# Patient Record
Sex: FEMALE | Race: WHITE | Employment: UNEMPLOYED | URBAN - NONMETROPOLITAN AREA
[De-identification: names, ages, dates, MRNs, and addresses within clinical notes are randomized per-mention and may not be internally consistent; named-entity substitution may affect disease eponyms.]

---

## 2017-08-28 ENCOUNTER — HOSPITAL ENCOUNTER (OUTPATIENT)
Dept: WOMENS IMAGING | Age: 47
Discharge: HOME | End: 2017-08-28
Payer: COMMERCIAL

## 2017-08-28 DIAGNOSIS — Z12.31 VISIT FOR SCREENING MAMMOGRAM: ICD-10-CM

## 2017-08-28 PROCEDURE — 77063 BREAST TOMOSYNTHESIS BI: CPT

## 2017-11-14 RX ORDER — PANTOPRAZOLE SODIUM 40 MG/1
TABLET, DELAYED RELEASE ORAL
Qty: 30 TABLET | Refills: 5 | OUTPATIENT
Start: 2017-11-14

## 2018-01-17 RX ORDER — PANTOPRAZOLE SODIUM 40 MG/1
TABLET, DELAYED RELEASE ORAL
Qty: 30 TABLET | Refills: 5 | OUTPATIENT
Start: 2018-01-17

## 2018-08-31 ENCOUNTER — HOSPITAL ENCOUNTER (OUTPATIENT)
Dept: WOMENS IMAGING | Age: 48
Discharge: HOME | End: 2018-08-31
Payer: COMMERCIAL

## 2018-08-31 DIAGNOSIS — Z12.39 BREAST CANCER SCREENING: ICD-10-CM

## 2018-08-31 PROCEDURE — 77063 BREAST TOMOSYNTHESIS BI: CPT

## 2018-09-04 ENCOUNTER — TELEPHONE (OUTPATIENT)
Dept: WOMENS IMAGING | Age: 48
End: 2018-09-04

## 2018-09-05 ENCOUNTER — HOSPITAL ENCOUNTER (OUTPATIENT)
Dept: ULTRASOUND IMAGING | Age: 48
End: 2018-09-05

## 2018-09-05 ENCOUNTER — HOSPITAL ENCOUNTER (OUTPATIENT)
Dept: WOMENS IMAGING | Age: 48
Discharge: HOME | End: 2018-09-05

## 2018-09-05 DIAGNOSIS — N63.0 BREAST NODULE: ICD-10-CM

## 2018-09-05 PROCEDURE — G0279 TOMOSYNTHESIS, MAMMO: HCPCS

## 2018-11-07 ENCOUNTER — TRANSCRIBE ORDERS (OUTPATIENT)
Dept: ADMINISTRATIVE | Facility: HOSPITAL | Age: 48
End: 2018-11-07

## 2018-11-07 DIAGNOSIS — M54.5 ACUTE LOW BACK PAIN, UNSPECIFIED BACK PAIN LATERALITY, WITH SCIATICA PRESENCE UNSPECIFIED: Primary | ICD-10-CM

## 2018-11-08 ENCOUNTER — HOSPITAL ENCOUNTER (OUTPATIENT)
Dept: MRI IMAGING | Facility: HOSPITAL | Age: 48
Discharge: HOME | End: 2018-11-08
Admitting: NURSE PRACTITIONER

## 2018-11-08 DIAGNOSIS — M54.5 ACUTE LOW BACK PAIN, UNSPECIFIED BACK PAIN LATERALITY, WITH SCIATICA PRESENCE UNSPECIFIED: ICD-10-CM

## 2018-11-08 LAB — CREAT BLDA-MCNC: 0.8 MG/DL (ref 0.6–1.3)

## 2018-11-08 PROCEDURE — 72158 MRI LUMBAR SPINE W/O & W/DYE: CPT

## 2018-11-08 PROCEDURE — 82565 ASSAY OF CREATININE: CPT

## 2018-11-08 PROCEDURE — A9577 INJ MULTIHANCE: HCPCS | Performed by: NURSE PRACTITIONER

## 2018-11-08 PROCEDURE — 0 GADOBENATE DIMEGLUMINE 529 MG/ML SOLUTION: Performed by: NURSE PRACTITIONER

## 2018-11-08 RX ADMIN — GADOBENATE DIMEGLUMINE 16 ML: 529 INJECTION, SOLUTION INTRAVENOUS at 07:53

## 2018-12-06 ENCOUNTER — OFFICE VISIT (OUTPATIENT)
Dept: NEUROSURGERY | Facility: CLINIC | Age: 48
End: 2018-12-06

## 2018-12-06 VITALS
BODY MASS INDEX: 29.8 KG/M2 | HEIGHT: 66 IN | DIASTOLIC BLOOD PRESSURE: 85 MMHG | WEIGHT: 185.4 LBS | SYSTOLIC BLOOD PRESSURE: 124 MMHG

## 2018-12-06 DIAGNOSIS — Z78.9 NON-SMOKER: ICD-10-CM

## 2018-12-06 DIAGNOSIS — M51.26 DISPLACEMENT OF LUMBAR INTERVERTEBRAL DISC WITHOUT MYELOPATHY: Primary | ICD-10-CM

## 2018-12-06 PROCEDURE — 99204 OFFICE O/P NEW MOD 45 MIN: CPT | Performed by: NURSE PRACTITIONER

## 2018-12-06 RX ORDER — DICLOFENAC SODIUM 75 MG/1
75 TABLET, DELAYED RELEASE ORAL 2 TIMES DAILY
Qty: 60 TABLET | Refills: 2 | Status: SHIPPED | OUTPATIENT
Start: 2018-12-06

## 2018-12-06 RX ORDER — LEVOTHYROXINE SODIUM 0.1 MG/1
100 TABLET ORAL DAILY
COMMUNITY

## 2018-12-06 RX ORDER — NAPROXEN 500 MG/1
TABLET ORAL
COMMUNITY

## 2018-12-06 RX ORDER — METAXALONE 800 MG/1
TABLET ORAL
COMMUNITY
Start: 2015-08-17

## 2018-12-06 RX ORDER — ATORVASTATIN CALCIUM 10 MG/1
10 TABLET, FILM COATED ORAL DAILY
COMMUNITY

## 2018-12-06 RX ORDER — FERROUS SULFATE 325(65) MG
TABLET ORAL
COMMUNITY
Start: 2015-08-17

## 2018-12-06 RX ORDER — LIOTHYRONINE SODIUM 5 UG/1
5 TABLET ORAL DAILY
COMMUNITY

## 2018-12-06 RX ORDER — CYCLOBENZAPRINE HCL 10 MG
TABLET ORAL
COMMUNITY
Start: 2015-08-17

## 2018-12-06 NOTE — PROGRESS NOTES
Chief complaint:   Chief Complaint   Patient presents with   • Back Pain     Cindy is here today with complaint of low back pain, she does have some left leg pain with numbness of the thigh.  She has already had an MRI of her lumbar, but she has not had any physical therapy.        Subjective     HPI: This is a 48-year-old feel patient who is referred to us by ROBERT Love for back pain.  She is here to be evaluated today.  She states that the pain in her back began on 10/17/2018.  There is no accident or injury associated with this.  She states that she just got out of bed his certain having pain in her back.  The pain is constant.  Its worse when she sitting for an extended period time and better when she is standing.  She does not have any lower extremity pain but does have numbness and tingling in her left thigh and the lateral aspect to her knee.  This is intermittent.  Nothing really makes this worse or better.  Denies any bowel or bladder incontinence.  She has not done any recent physical therapy.  She did try chiropractic care but did not have any meaningful relief.  She not had any pain management injections.  She rates the pain on scale 0-10 at its most intense at an 8.  The pain does wax and wane.  She is left-hand dominant.  She is self-employed.  She is .  Denies any tobacco, alcohol, or illicit drug use.  Past medical history is significant for a complete thyroidectomy as well as hypercholesterolemia and acid reflux.    Review of Systems   Musculoskeletal: Positive for back pain, neck pain and neck stiffness.   Neurological: Positive for numbness and headaches.   All other systems reviewed and are negative.       Past Medical History:   Diagnosis Date   • No known health problems      Past Surgical History:   Procedure Laterality Date   • THYROIDECTOMY     • TONSILLECTOMY       Family History   Problem Relation Age of Onset   • Heart disease Father      Social History     Tobacco Use  "  • Smoking status: Never Smoker   • Smokeless tobacco: Never Used   Substance Use Topics   • Alcohol use: No     Frequency: Never   • Drug use: No       (Not in a hospital admission)  Allergies:  Patient has no known allergies.    Objective      Vital Signs  /85 (BP Location: Right arm, Patient Position: Sitting)   Ht 167.6 cm (66\")   Wt 84.1 kg (185 lb 6.4 oz)   BMI 29.92 kg/m²     Physical Exam   Constitutional: She is oriented to person, place, and time. She appears well-developed and well-nourished.   HENT:   Head: Normocephalic.   Eyes: Conjunctivae, EOM and lids are normal. Pupils are equal, round, and reactive to light.   Neck: Normal range of motion.   Cardiovascular: Normal rate, regular rhythm and normal heart sounds.   Pulmonary/Chest: Effort normal and breath sounds normal.   Abdominal: Normal appearance.   Musculoskeletal: Normal range of motion.        Cervical back: She exhibits pain.        Lumbar back: She exhibits pain.   Neurological: She is alert and oriented to person, place, and time. She has normal strength and normal reflexes. She displays normal reflexes. No cranial nerve deficit or sensory deficit. GCS eye subscore is 4. GCS verbal subscore is 5. GCS motor subscore is 6.   Skin: Skin is warm.   Psychiatric: She has a normal mood and affect. Her speech is normal and behavior is normal. Thought content normal. Cognition and memory are normal.       Results Review: MRI of the lumbar spine that was done here at [facility] shows the patient does have a disc bulging present at L4-5 that is putting some mild pressure on the thecal sac and causing bilateral neuroforaminal narrowing to a mild degree.  No malalignment or fracture visualized.  No cord signal change.          Assessment/Plan: At this point I am going to start the patient into a dedicated course of physical therapy consisting of 2-3 times a week for 4-6 weeks.  I am also to start her on diclofenac to see if this will help better " control her pain.  We will follow-up again with her in 8-10 weeks which time she will see Dr. Gloria.  Should she not have any improvement from the therapy she may be a good candidate for possible injections in her back.  BMI shows that she is overweight.  BMI chart was given the patient.  She is a nonsmoker.      Cindy was seen today for back pain.    Diagnoses and all orders for this visit:    Displacement of lumbar intervertebral disc without myelopathy  -     Ambulatory Referral to Physical Therapy Evaluate and treat (2-3 days a week for 4-6 weeks )    BMI 29.0-29.9,adult    Non-smoker    Other orders  -     diclofenac (VOLTAREN) 75 MG EC tablet; Take 1 tablet by mouth 2 (Two) Times a Day.          I discussed the patients findings and my recommendations with patient    Leonardo Messer, ROBERT  12/06/18  1:27 PM

## 2018-12-06 NOTE — PATIENT INSTRUCTIONS

## 2018-12-13 ENCOUNTER — APPOINTMENT (OUTPATIENT)
Dept: PHYSICAL THERAPY | Facility: HOSPITAL | Age: 48
End: 2018-12-13

## 2018-12-17 ENCOUNTER — HOSPITAL ENCOUNTER (OUTPATIENT)
Dept: PHYSICAL THERAPY | Facility: HOSPITAL | Age: 48
Setting detail: THERAPIES SERIES
Discharge: HOME | End: 2018-12-17

## 2018-12-17 DIAGNOSIS — M51.26 DISPLACEMENT OF LUMBAR INTERVERTEBRAL DISC WITHOUT MYELOPATHY: Primary | ICD-10-CM

## 2018-12-17 PROCEDURE — 97161 PT EVAL LOW COMPLEX 20 MIN: CPT | Performed by: PHYSICAL THERAPIST

## 2018-12-18 NOTE — THERAPY EVALUATION
Outpatient Physical Therapy Ortho Initial Evaluation  [facility]     Patient Name: Cindy Robert  : 1970  MRN: [id number]  Today's Date: 2018      Visit Date: 2018     Subjective Improvement:   N/A    Attendance:   Approved:   Requires insurance authorization        MD follow up:  3/14/19          date:   19        Patient Active Problem List   Diagnosis   • Displacement of lumbar intervertebral disc without myelopathy   • BMI 29.0-29.9,adult   • Non-smoker        Past Medical History:   Diagnosis Date   • No known health problems         Past Surgical History:   Procedure Laterality Date   • THYROIDECTOMY     • TONSILLECTOMY       No Known Allergies      Current Outpatient Medications:   •  atorvastatin (LIPITOR) 10 MG tablet, Take 10 mg by mouth Daily., Disp: , Rfl:   •  cyclobenzaprine (FLEXERIL) 10 MG tablet, 10 mg one tab by mouth as needed, Disp: , Rfl:   •  diclofenac (VOLTAREN) 75 MG EC tablet, Take 1 tablet by mouth 2 (Two) Times a Day., Disp: 60 tablet, Rfl: 2  •  ferrous sulfate 325 (65 FE) MG tablet, 325 mg one tab by mouth daily, Disp: , Rfl:   •  levothyroxine (SYNTHROID, LEVOTHROID) 100 MCG tablet, Take 100 mcg by mouth Daily., Disp: , Rfl:   •  liothyronine (CYTOMEL) 5 MCG tablet, Take 5 mcg by mouth Daily., Disp: , Rfl:   •  metaxalone (SKELAXIN) 800 MG tablet, 800 mg one tab by mouth  as needed, Disp: , Rfl:   •  naproxen (NAPROSYN) 500 MG tablet, naproxen 500 mg tablet  TAKE 1 TABLET BY MOUTH TWICE A DAY WITH FOOD, Disp: , Rfl:   •  pantoprazole (PROTONIX) 40 MG EC tablet, TAKE 1 TABLET BY MOUTH EVERY DAY, Disp: 30 tablet, Rfl: 5      Visit Dx:     ICD-10-CM ICD-9-CM   1. Displacement of lumbar intervertebral disc without myelopathy M51.26 722.10       Patient History     Row Name 18 1400             History    Chief Complaint  Pain  -KG      Type of Pain  Back pain  -KG      Date Current Problem(s) Began  -- Mid October  -KG      Brief  Description of Current Complaint  Pt presents with c/o low back pain. In mid October pt reports she was mopping her floors, even mopped on hands/knees. Next morning woke up the next morning with a catch in her back. Didn't get any better and she was having a lot of pain and ended up going to the MD. Pt mainly has low back pain, does have intermittent numbness in L lateral leg but was having that issue prior to her onset of LBP. Pt states she is doing much better than she was when her incident first happened. Pt lives in a single level home, 1 step to enter. Pt lives with her . States if the muscles in her back tense up or get tight, that's when she starts having increased pain in her back.  -KG      Patient/Caregiver Goals  Relieve pain;Improve strength;Improve mobility  -KG      Occupation/sports/leisure activities  Pt is a self-employed  for various [employer]; pt enjoys cooking and walking  -KG      What clinical tests have you had for this problem?  MRI  -KG      Results of Clinical Tests  L4-L5 disc bulge and midline protrusion produce mild spinal stenosis  -KG         Pain     Pain Location  Back  -KG      Pain at Present  0  -KG      Pain at Best  0  -KG      Pain at Worst  4  -KG      Pain Frequency  Intermittent  -KG      Pain Description  Aching;Dull  -KG      What Performance Factors Make the Current Problem(s) WORSE?  Sitting for short periods, driving longer than local distances  -KG      What Performance Factors Make the Current Problem(s) BETTER?  Standing helps improve pain  -KG      Pain Comments  Taking Naproxen; will go back to taking Diclofenac after pt finishes round of Augmentin for another issue  -KG      Tolerance Time- Standing  No issues  -KG      Tolerance Time- Sitting  Short periods  -KG      Tolerance Time- Walking  No issues  -KG      Is your sleep disturbed?  Yes Due to other issues as well besides LBP  -KG      Difficulties at work?  Does have  increased pain with sitting for longer periods while on computer, etc.  -KG      Difficulties with ADL's?  Independent  -KG        User Key  (r) = Recorded By, (t) = Taken By, (c) = Cosigned By    Initials Name Provider Type    KG Miya Solares, PT Physical Therapist          PT Ortho     Row Name 12/17/18 1400       Subjective Comments    Subjective Comments  Reports history of neck pain/mild stenosis. Refer to therapy history for details.  -KG       Precautions and Contraindications    Precautions/Limitations  no known precautions/limitations  -KG       Subjective Pain    Able to rate subjective pain?  yes  -KG    Pre-Treatment Pain Level  0  -KG       Posture/Observations    Posture/Observations Comments  No acute distress. Fair postural awareness noted overall. Ambulates with no AD, non-antalgic gait. ASIS equal upon observation. Sacrum slightly more prominent on L.  -KG       Quarter Clearing    Quarter Clearing  Lower Quarter Clearing  -KG       DTR- Lower Quarter Clearing    Patellar tendon (L2-4)  Bilateral:;2- Normal response  -KG    Achilles tendon (S1-2)  Bilateral:;2- Normal response  -KG       Neural Tension Signs- Lower Quarter Clearing    Slump  Bilateral:;Negative  -KG    SLR  Bilateral:;Negative  -KG       Sensory Screen for Light Touch- Lower Quarter Clearing    L1 (inguinal area)  Bilateral:;Intact  -KG    L2 (anterior mid thigh)  Bilateral:;Intact  -KG    L3 (distal anterior thigh)  Bilateral:;Intact  -KG    L4 (medial lower leg/foot)  Bilateral:;Intact  -KG    L5 (lateral lower leg/great toe)  Bilateral:;Intact  -KG    S1 (bottom of foot)  Bilateral:;Intact  -KG       Myotomal Screen- Lower Quarter Clearing    Hip flexion (L2)  Bilateral:;WNL  -KG    Knee extension (L3)  Bilateral:;WNL  -KG    Ankle DF (L4)  Bilateral:;WNL  -KG    Great toe extension (L5)  Bilateral:;WNL  -KG    Ankle PF (S1)  Bilateral:;WNL  -KG    Knee flexion (S2)  Bilateral:;WNL  -KG       Lumbar ROM Screen- Lower  Quarter Clearing    Lumbar Flexion  Normal No pain; fingertips to mid shin  -KG    Lumbar Extension  Normal  -KG    Lumbar Lateral Flexion  Normal  -KG       SI/Hip Screen- Lower Quarter Clearing    ASIS compression  Negative  -KG    ASIS distraction  Negative  -KG    Laura's/Zachary's test  Negative  -KG       Special Tests/Palpation    Special Tests/Palpation  Lumbar/SI  -KG       Lumbosacral Palpation    Lumbosacral Palpation?  Yes  -KG    SI  Right:;Tender  -KG    Piriformis  Bilateral:;Tender;Guarded/taut L > R  -KG    Erector Spinae (Paraspinals)  Bilateral:;Tender;Guarded/taut L5-S1  -KG       General ROM    GENERAL ROM COMMENTS  BLE hip/knee/ankle AROM WNL without increased pain. Bilateral hip PROM WFL and non-painful  -KG       MMT (Manual Muscle Testing)    Rt Lower Ext  Rt Hip Flexion;Rt Hip ABduction;Rt Hip ADduction;Rt Knee Extension;Rt Knee Flexion;Rt Ankle Dorsiflexion  -KG    Lt Lower Ext  Lt Hip Flexion;Lt Hip ABduction;Lt Hip ADduction;Lt Knee Extension;Lt Knee Flexion;Lt Ankle Dorsiflexion  -KG       MMT Right Lower Ext    Rt Hip Flexion MMT, Gross Movement  (4+/5) good plus  -KG    Rt Hip ABduction MMT, Gross Movement  (4+/5) good plus  -KG    Rt Hip ADduction MMT, Gross Movement  (5/5) normal  -KG    Rt Knee Extension MMT, Gross Movement  (5/5) normal  -KG    Rt Knee Flexion MMT, Gross Movement  (5/5) normal  -KG    Rt Ankle Dorsiflexion MMT, Gross Movement  (5/5) normal  -KG       MMT Left Lower Ext    Lt Hip Flexion MMT, Gross Movement  (4+/5) good plus  -KG    Lt Hip ABduction MMT, Gross Movement  (4+/5) good plus  -KG    Lt Hip ADduction MMT, Gross Movement  (5/5) normal  -KG    Lt Knee Extension MMT, Gross Movement  (5/5) normal  -KG    Lt Knee Flexion MMT, Gross Movement  (5/5) normal  -KG    Lt Ankle Dorsiflexion MMT, Gross Movement  (5/5) normal  -KG       Sensation    Sensation WNL?  WNL  -KG    Light Touch  No apparent deficits  -KG       Flexibility    Flexibility Tested?  Lower  Extremity  -KG       Lower Extremity Flexibility    Hamstrings  Bilateral:;Moderately limited  -KG    Quadriceps  Bilateral:;Mildly limited  -KG    Hip External Rotators  Bilateral:;Mildly limited  -KG      User Key  (r) = Recorded By, (t) = Taken By, (c) = Cosigned By    Initials Name Provider Type    Miya Leslie, PT Physical Therapist                      Therapy Education  Education Details: POC education. Anatomy education related to condition. Work station ergonomics. HEP: prone hip IR tband, supine piriformis stretch, supine HS stretch, iso TrA contraction, scap retraction  Given: HEP, Symptoms/condition management, Pain management, Posture/body mechanics  Program: New  How Provided: Verbal, Demonstration, Written  Provided to: Patient  Level of Understanding: Teach back education performed, Verbalized, Demonstrated     PT OP Goals     Row Name 12/17/18 1400          PT Short Term Goals    STG Date to Achieve  01/07/19  -KG     STG 1  Demonstrate independence/compliance with HEP  -KG     STG 1 Progress  New  -KG     STG 2  Tolerate 45 minute treatment session without increased pain  -KG     STG 2 Progress  New  -KG     STG 3  Demonstrate independence in isometric TrA activities throughout treatment session for carryover into performance of daily functional activities  -KG     STG 3 Progress  New  -KG        Long Term Goals    LTG Date to Achieve  01/28/19  -KG     LTG 1  Subjectively report 60% improvement or greater  -KG     LTG 1 Progress  New  -KG     LTG 2  Improve Modified Oswestry score to 15% or less  -KG     LTG 2 Progress  New  -KG     LTG 3  Demonstrate bilateral hip flex/abd MMT to 5/5  -KG     LTG 3 Progress  New  -KG     LTG 4  Demonstrate independence in proper posture/body mechanics throughout treatment session for for carryover into performance of daily functional & work activities  -KG     LTG 4 Progress  New  -KG     LTG 5  Demonstrate lumbar flex fingertips to proximal ankles  without increased pain  -KG     LTG 5 Progress  New  -KG        Time Calculation    PT Goal Re-Cert Due Date  01/07/19  -KG       User Key  (r) = Recorded By, (t) = Taken By, (c) = Cosigned By    Initials Name Provider Type    KG Miya Solares, PT Physical Therapist          PT Assessment/Plan     Row Name 12/17/18 1400          PT Assessment    Functional Limitations  Limitation in home management;Limitations in community activities;Limitations in functional capacity and performance;Performance in leisure activities;Performance in work activities  -KG     Impairments  Impaired flexibility;Impaired muscle endurance;Muscle strength;Pain;Posture;Poor body mechanics  -KG     Assessment Comments  Pt is a 48 y.o. female presenting with low back pain. Pt denies symptoms in BLE's and neurotension signs negative this date. Pt demonstrates impaired core stabilization and overall decreased postural awareness. Tightness noted at hip musculature bilaterally. Pt presents with ext vs flex bias. Pt will benefit from skilled PT for improvements in overall core strength/stability, postural stability, and tolerance to daily work/functional activities.  -KG     Please refer to paper survey for additional self-reported information  Yes  -KG     Rehab Potential  Good  -KG     Patient/caregiver participated in establishment of treatment plan and goals  Yes  -KG     Patient would benefit from skilled therapy intervention  Yes  -KG        PT Plan    PT Frequency  2x/week  -KG     Predicted Duration of Therapy Intervention (Therapy Eval)  4 weeks  -KG     Planned CPT's?  PT EVAL LOW COMPLEXITY: 49156;PT RE-EVAL: 81086;PT THER PROC EA 15 MIN: 06367;PT THER ACT EA 15 MIN: 38544;PT MANUAL THERAPY EA 15 MIN: 78726;PT NEUROMUSC RE-EDUCATION EA 15 MIN: 65316;PT AQUATIC THERAPY EA 15 MIN: 14581;PT SELF CARE/HOME MGMT/TRAIN EA 15: 67727;PT ELECTRICAL STIM UNATTEND: ;PT THER SUPP EA 15 MIN  -KG     Physical Therapy Interventions (Optional  "Details)  home exercise program;lumbar stabilization;joint mobilization;manual therapy techniques;modalities;neuromuscular re-education;patient/family education;postural re-education;strengthening;stretching;ROM (Range of Motion)  -KG     PT Plan Comments  Focus on core stabilization and postural stabilization activities. Manual/STM & MFR prn to lumbar paraspinals/piriformis. Monitor alignment. /posture education. Prone stabalization. Modalities prn for pain.  -KG       User Key  (r) = Recorded By, (t) = Taken By, (c) = Cosigned By    Initials Name Provider Type    Miya Leslie, PT Physical Therapist          Modalities     Row Name 12/17/18 1400             Subjective Pain    Post-Treatment Pain Level  0  -KG      Subjective Pain Comment  \"sore\" post treatment  -KG         Moist Heat    MH Applied  -- Pt defers modalities.  -KG        User Key  (r) = Recorded By, (t) = Taken By, (c) = Cosigned By    Initials Name Provider Type    Miya Leslie, PT Physical Therapist        Exercises     Row Name 12/17/18 1400             Precautions    Existing Precautions/Restrictions  no known precautions/restrictions  -KG         Subjective Comments    Subjective Comments  Reports history of neck pain/mild stenosis. Refer to therapy history for details.  -KG         Subjective Pain    Able to rate subjective pain?  yes  -KG      Pre-Treatment Pain Level  0  -KG      Post-Treatment Pain Level  0  -KG      Subjective Pain Comment  \"sore\" post treatment  -KG         Aquatics    Aquatics performed?  No  -KG         Exercise 1    Exercise Name 1  Prone hip IR with tband loop  -KG      Cueing 1  Verbal  -KG      Additional Comments  Review/demo for HEP  -KG         Exercise 2    Exercise Name 2  Supine hamstring stretch  -KG      Cueing 2  Verbal  -KG      Additional Comments  Review/demo for HEP  -KG         Exercise 3    Exercise Name 3  Supine piriformis stretch  -KG      Cueing 3  Verbal  -KG      " Additional Comments  Review/demo for HEP  -KG         Exercise 4    Exercise Name 4  Isometric TrA education/contraction  -KG      Additional Comments  Review/demo for HEP  -KG         Exercise 5    Exercise Name 5  Scap retraction  -KG      Additional Comments  Review/demo for HEP  -KG         Exercise 6    Exercise Name 6  Educated on prone on elbows for HEP  -KG        User Key  (r) = Recorded By, (t) = Taken By, (c) = Cosigned By    Initials Name Provider Type    Miya Leslie, PT Physical Therapist           Manual Rx (last 36 hours)      Manual Treatments     Row Name 12/17/18 1400             Manual Rx 1    Manual Rx 1 Location  Lumbar paraspinals, piriformis/post hip  -KG      Manual Rx 1 Type  STM/MFR  -KG      Manual Rx 1 Duration  6 min  -KG        User Key  (r) = Recorded By, (t) = Taken By, (c) = Cosigned By    Initials Name Provider Type    Miya Leslie, PT Physical Therapist                      Outcome Measure Options: Modifed Owestry  Modified Oswestry  Modified Oswestry Score/Comments: 22%      Time Calculation:     Therapy Suggested Charges     Code   Minutes Charges    None             Start Time: 1436  Stop Time: 1522  Time Calculation (min): 46 min  Total Timed Code Minutes- PT: 0 minute(s)     Therapy Charges for Today     Code Description Service Date Service Provider Modifiers Qty    06547066603 HC PT EVAL LOW COMPLEXITY 3 12/17/2018 Miya Solares, PT GP 1          PT G-Codes  Outcome Measure Options: Modifed Owestry  Modified Oswestry Score/Comments: 22%         Miya Solares, [place]  12/17/2018

## 2018-12-27 ENCOUNTER — APPOINTMENT (OUTPATIENT)
Dept: PHYSICAL THERAPY | Facility: HOSPITAL | Age: 48
End: 2018-12-27

## 2019-05-13 ENCOUNTER — DOCUMENTATION (OUTPATIENT)
Dept: PHYSICAL THERAPY | Facility: HOSPITAL | Age: 49
End: 2019-05-13

## 2019-05-13 DIAGNOSIS — M51.26 DISPLACEMENT OF LUMBAR INTERVERTEBRAL DISC WITHOUT MYELOPATHY: Primary | ICD-10-CM

## 2019-05-13 NOTE — THERAPY DISCHARGE NOTE
Outpatient Physical Therapy Discharge Summary         Patient Name: Cindy Robert  : 1970  MRN: [id number]    Today's Date: 2019    Visit Dx:    ICD-10-CM ICD-9-CM   1. Displacement of lumbar intervertebral disc without myelopathy M51.26 722.10       PT OP Goals     Row Name 19 1300          PT Short Term Goals    STG Date to Achieve  19  -KG     STG 1  Demonstrate independence/compliance with HEP  -KG     STG 1 Progress  Not Met  -KG     STG 2  Tolerate 45 minute treatment session without increased pain  -KG     STG 2 Progress  Not Met  -KG     STG 3  Demonstrate independence in isometric TrA activities throughout treatment session for carryover into performance of daily functional activities  -KG     STG 3 Progress  Not Met  -KG        Long Term Goals    LTG Date to Achieve  19  -KG     LTG 1  Subjectively report 60% improvement or greater  -KG     LTG 1 Progress  Not Met  -KG     LTG 2  Improve Modified Oswestry score to 15% or less  -KG     LTG 2 Progress  Not Met  -KG     LTG 3  Demonstrate bilateral hip flex/abd MMT to 5/5  -KG     LTG 3 Progress  Not Met  -KG     LTG 4  Demonstrate independence in proper posture/body mechanics throughout treatment session for for carryover into performance of daily functional & work activities  -KG     LTG 4 Progress  Not Met  -KG     LTG 5  Demonstrate lumbar flex fingertips to proximal ankles without increased pain  -KG     LTG 5 Progress  Not Met  -KG       User Key  (r) = Recorded By, (t) = Taken By, (c) = Cosigned By    Initials Name Provider Type    Miya Leslie, PT Physical Therapist          OP PT Discharge Summary  Date of Discharge: 19  Reason for Discharge: other (comment)(Pt did not return to PT after initial evaluation.)  Outcomes Achieved: Unable to make functional progress toward goals at this time  Discharge Destination: Home with home program(Given HEP at initial eval)      Time Calculation:                     Miya Solares, PT  5/13/2019

## 2019-09-23 ENCOUNTER — HOSPITAL ENCOUNTER (OUTPATIENT)
Dept: WOMENS IMAGING | Age: 49
Discharge: HOME | End: 2019-09-23
Payer: COMMERCIAL

## 2019-09-23 DIAGNOSIS — Z12.31 ENCOUNTER FOR SCREENING MAMMOGRAM FOR MALIGNANT NEOPLASM OF BREAST: ICD-10-CM

## 2019-09-23 PROCEDURE — 77063 BREAST TOMOSYNTHESIS BI: CPT

## 2019-12-25 ENCOUNTER — APPOINTMENT (OUTPATIENT)
Dept: GENERAL RADIOLOGY | Age: 49
End: 2019-12-25
Payer: COMMERCIAL

## 2019-12-25 ENCOUNTER — HOSPITAL ENCOUNTER (EMERGENCY)
Age: 49
Discharge: HOME | End: 2019-12-25
Attending: EMERGENCY MEDICINE
Payer: COMMERCIAL

## 2019-12-25 VITALS
HEIGHT: 66 IN | TEMPERATURE: 98.4 F | WEIGHT: 175 LBS | BODY MASS INDEX: 28.12 KG/M2 | OXYGEN SATURATION: 96 % | RESPIRATION RATE: 16 BRPM | HEART RATE: 59 BPM | SYSTOLIC BLOOD PRESSURE: 125 MMHG | DIASTOLIC BLOOD PRESSURE: 80 MMHG

## 2019-12-25 DIAGNOSIS — R07.89 OTHER CHEST PAIN: Primary | ICD-10-CM

## 2019-12-25 LAB
ALBUMIN SERPL-MCNC: 4.4 G/DL (ref 3.5–5.2)
ALP BLD-CCNC: 87 U/L (ref 35–104)
ALT SERPL-CCNC: 13 U/L (ref 5–33)
ANION GAP SERPL CALCULATED.3IONS-SCNC: 14 MMOL/L (ref 7–19)
AST SERPL-CCNC: 17 U/L (ref 5–32)
BACTERIA: NEGATIVE /HPF
BASOPHILS ABSOLUTE: 0 K/UL (ref 0–0.2)
BASOPHILS RELATIVE PERCENT: 0.5 % (ref 0–1)
BILIRUB SERPL-MCNC: 0.3 MG/DL (ref 0.2–1.2)
BILIRUBIN URINE: NEGATIVE
BLOOD, URINE: NEGATIVE
BUN BLDV-MCNC: 15 MG/DL (ref 6–20)
CALCIUM SERPL-MCNC: 9.6 MG/DL (ref 8.6–10)
CHLORIDE BLD-SCNC: 98 MMOL/L (ref 98–111)
CLARITY: CLEAR
CO2: 24 MMOL/L (ref 22–29)
COLOR: YELLOW
CREAT SERPL-MCNC: 0.8 MG/DL (ref 0.5–0.9)
D DIMER: 0.46 UG/ML FEU (ref 0–0.48)
EOSINOPHILS ABSOLUTE: 0.1 K/UL (ref 0–0.6)
EOSINOPHILS RELATIVE PERCENT: 1.3 % (ref 0–5)
EPITHELIAL CELLS, UA: 2 /HPF (ref 0–5)
GFR NON-AFRICAN AMERICAN: >60
GLUCOSE BLD-MCNC: 147 MG/DL (ref 74–109)
GLUCOSE URINE: NEGATIVE MG/DL
HCT VFR BLD CALC: 43.2 % (ref 37–47)
HEMOGLOBIN: 14.3 G/DL (ref 12–16)
HYALINE CASTS: 0 /HPF (ref 0–8)
IMMATURE GRANULOCYTES #: 0 K/UL
KETONES, URINE: NEGATIVE MG/DL
LEUKOCYTE ESTERASE, URINE: ABNORMAL
LYMPHOCYTES ABSOLUTE: 2.2 K/UL (ref 1.1–4.5)
LYMPHOCYTES RELATIVE PERCENT: 28.9 % (ref 20–40)
MCH RBC QN AUTO: 29.1 PG (ref 27–31)
MCHC RBC AUTO-ENTMCNC: 33.1 G/DL (ref 33–37)
MCV RBC AUTO: 87.8 FL (ref 81–99)
MONOCYTES ABSOLUTE: 1.1 K/UL (ref 0–0.9)
MONOCYTES RELATIVE PERCENT: 14.5 % (ref 0–10)
NEUTROPHILS ABSOLUTE: 4.1 K/UL (ref 1.5–7.5)
NEUTROPHILS RELATIVE PERCENT: 54.4 % (ref 50–65)
NITRITE, URINE: NEGATIVE
PDW BLD-RTO: 11.5 % (ref 11.5–14.5)
PH UA: 5.5 (ref 5–8)
PLATELET # BLD: 316 K/UL (ref 130–400)
PMV BLD AUTO: 9.3 FL (ref 9.4–12.3)
POTASSIUM REFLEX MAGNESIUM: 3.7 MMOL/L (ref 3.5–5)
PROTEIN UA: NEGATIVE MG/DL
RBC # BLD: 4.92 M/UL (ref 4.2–5.4)
RBC UA: 1 /HPF (ref 0–4)
SODIUM BLD-SCNC: 136 MMOL/L (ref 136–145)
SPECIFIC GRAVITY UA: 1.01 (ref 1–1.03)
TOTAL PROTEIN: 7.6 G/DL (ref 6.6–8.7)
TROPONIN: <0.01 NG/ML (ref 0–0.03)
TROPONIN: <0.01 NG/ML (ref 0–0.03)
URINE REFLEX TO CULTURE: YES
UROBILINOGEN, URINE: 0.2 E.U./DL
WBC # BLD: 7.6 K/UL (ref 4.8–10.8)
WBC UA: 6 /HPF (ref 0–5)

## 2019-12-25 PROCEDURE — 80053 COMPREHEN METABOLIC PANEL: CPT

## 2019-12-25 PROCEDURE — 99285 EMERGENCY DEPT VISIT HI MDM: CPT

## 2019-12-25 PROCEDURE — 81001 URINALYSIS AUTO W/SCOPE: CPT

## 2019-12-25 PROCEDURE — 84484 ASSAY OF TROPONIN QUANT: CPT

## 2019-12-25 PROCEDURE — 36415 COLL VENOUS BLD VENIPUNCTURE: CPT

## 2019-12-25 PROCEDURE — 93005 ELECTROCARDIOGRAM TRACING: CPT

## 2019-12-25 PROCEDURE — 85025 COMPLETE CBC W/AUTO DIFF WBC: CPT

## 2019-12-25 PROCEDURE — 87086 URINE CULTURE/COLONY COUNT: CPT

## 2019-12-25 PROCEDURE — 85379 FIBRIN DEGRADATION QUANT: CPT

## 2019-12-25 PROCEDURE — 71045 X-RAY EXAM CHEST 1 VIEW: CPT

## 2019-12-25 RX ORDER — 0.9 % SODIUM CHLORIDE 0.9 %
1000 INTRAVENOUS SOLUTION INTRAVENOUS ONCE
Status: DISCONTINUED | OUTPATIENT
Start: 2019-12-25 | End: 2019-12-25 | Stop reason: HOSPADM

## 2019-12-25 SDOH — HEALTH STABILITY: MENTAL HEALTH: HOW OFTEN DO YOU HAVE A DRINK CONTAINING ALCOHOL?: NEVER

## 2019-12-25 ASSESSMENT — ENCOUNTER SYMPTOMS
CONSTIPATION: 0
APNEA: 0
FACIAL SWELLING: 0
SHORTNESS OF BREATH: 0
CHEST TIGHTNESS: 1
SORE THROAT: 0
SINUS PRESSURE: 0
DIARRHEA: 0
NAUSEA: 0
VOICE CHANGE: 0
BLOOD IN STOOL: 0
EYE DISCHARGE: 0
CHOKING: 0

## 2019-12-25 ASSESSMENT — PAIN DESCRIPTION - LOCATION: LOCATION: CHEST

## 2019-12-25 ASSESSMENT — PAIN SCALES - GENERAL
PAINLEVEL_OUTOF10: 1
PAINLEVEL_OUTOF10: 6

## 2019-12-25 ASSESSMENT — PAIN DESCRIPTION - PAIN TYPE: TYPE: ACUTE PAIN

## 2019-12-25 ASSESSMENT — HEART SCORE: ECG: 0

## 2019-12-25 ASSESSMENT — PAIN DESCRIPTION - DESCRIPTORS: DESCRIPTORS: PRESSURE

## 2019-12-27 LAB — URINE CULTURE, ROUTINE: NORMAL

## 2019-12-29 LAB
EKG P AXIS: 26 DEGREES
EKG P AXIS: 26 DEGREES
EKG P-R INTERVAL: 144 MS
EKG P-R INTERVAL: 170 MS
EKG Q-T INTERVAL: 424 MS
EKG Q-T INTERVAL: 434 MS
EKG QRS DURATION: 74 MS
EKG QRS DURATION: 80 MS
EKG QTC CALCULATION (BAZETT): 421 MS
EKG QTC CALCULATION (BAZETT): 434 MS
EKG T AXIS: 34 DEGREES
EKG T AXIS: 35 DEGREES

## 2020-01-28 ENCOUNTER — OFFICE VISIT (OUTPATIENT)
Dept: SURGERY | Age: 50
End: 2020-01-28
Payer: COMMERCIAL

## 2020-01-28 ENCOUNTER — HOSPITAL ENCOUNTER (OUTPATIENT)
Dept: PREADMISSION TESTING | Age: 50
Discharge: HOME | End: 2020-02-01
Payer: COMMERCIAL

## 2020-01-28 VITALS
DIASTOLIC BLOOD PRESSURE: 70 MMHG | WEIGHT: 179.6 LBS | BODY MASS INDEX: 28.87 KG/M2 | HEIGHT: 66 IN | SYSTOLIC BLOOD PRESSURE: 126 MMHG

## 2020-01-28 PROCEDURE — 99203 OFFICE O/P NEW LOW 30 MIN: CPT | Performed by: SURGERY

## 2020-01-28 RX ORDER — CYCLOBENZAPRINE HCL 10 MG
TABLET ORAL
COMMUNITY
Start: 2015-08-17

## 2020-01-28 RX ORDER — LIOTHYRONINE SODIUM 5 UG/1
TABLET ORAL
COMMUNITY
Start: 2019-09-16

## 2020-01-28 RX ORDER — NAPROXEN 500 MG/1
500 TABLET ORAL
COMMUNITY

## 2020-01-28 RX ORDER — METAXALONE 800 MG/1
800 TABLET ORAL 3 TIMES DAILY PRN
COMMUNITY
Start: 2015-08-17 | End: 2020-09-10

## 2020-01-28 RX ORDER — ATORVASTATIN CALCIUM 10 MG/1
TABLET, FILM COATED ORAL
COMMUNITY
Start: 2020-01-10 | End: 2020-01-28

## 2020-01-28 RX ORDER — FLUTICASONE PROPIONATE 50 MCG
1 SPRAY, SUSPENSION (ML) NASAL DAILY
COMMUNITY
End: 2020-09-10

## 2020-01-28 RX ORDER — CETIRIZINE HYDROCHLORIDE 10 MG/1
10 TABLET ORAL DAILY
COMMUNITY
End: 2020-09-10

## 2020-01-28 RX ORDER — FLUTICASONE PROPIONATE 50 MCG
SPRAY, SUSPENSION (ML) NASAL
COMMUNITY
Start: 2019-12-02 | End: 2020-01-28

## 2020-01-28 RX ORDER — AMOXICILLIN AND CLAVULANATE POTASSIUM 875; 125 MG/1; MG/1
1 TABLET, FILM COATED ORAL 2 TIMES DAILY
COMMUNITY
Start: 2020-01-27 | End: 2020-09-10

## 2020-01-28 RX ORDER — LEVOTHYROXINE SODIUM 0.1 MG/1
TABLET ORAL
COMMUNITY
Start: 2020-01-10 | End: 2020-01-28

## 2020-01-28 RX ORDER — LEVOTHYROXINE SODIUM 0.1 MG/1
100 TABLET ORAL DAILY
COMMUNITY

## 2020-01-28 RX ORDER — ATORVASTATIN CALCIUM 10 MG/1
10 TABLET, FILM COATED ORAL
COMMUNITY
End: 2020-09-10

## 2020-01-30 ENCOUNTER — PREP FOR PROCEDURE (OUTPATIENT)
Dept: SURGERY | Age: 50
End: 2020-01-30

## 2020-01-30 RX ORDER — SODIUM CHLORIDE 0.9 % (FLUSH) 0.9 %
10 SYRINGE (ML) INJECTION EVERY 12 HOURS SCHEDULED
Status: CANCELLED | OUTPATIENT
Start: 2020-01-30

## 2020-01-30 RX ORDER — SODIUM CHLORIDE, SODIUM LACTATE, POTASSIUM CHLORIDE, CALCIUM CHLORIDE 600; 310; 30; 20 MG/100ML; MG/100ML; MG/100ML; MG/100ML
INJECTION, SOLUTION INTRAVENOUS CONTINUOUS
Status: CANCELLED | OUTPATIENT
Start: 2020-01-30

## 2020-01-30 RX ORDER — SODIUM CHLORIDE 0.9 % (FLUSH) 0.9 %
10 SYRINGE (ML) INJECTION PRN
Status: CANCELLED | OUTPATIENT
Start: 2020-01-30

## 2020-01-30 ASSESSMENT — ENCOUNTER SYMPTOMS
ABDOMINAL DISTENTION: 1
VOMITING: 0
CHEST TIGHTNESS: 1
SHORTNESS OF BREATH: 1
BACK PAIN: 1
ABDOMINAL PAIN: 1
WHEEZING: 0
COLOR CHANGE: 0
TROUBLE SWALLOWING: 0
CONSTIPATION: 0
SORE THROAT: 0
BLOOD IN STOOL: 0
NAUSEA: 0
COUGH: 1
DIARRHEA: 1
SINUS PRESSURE: 0

## 2020-01-30 NOTE — H&P (VIEW-ONLY)
by mouth 3 times daily as needed           No current facility-administered medications on file prior to visit.       Allergies: Patient has no known allergies.     Family History         Family History   Problem Relation Age of Onset    Heart Disease Mother      Other Father           Natural causes            Social History            Tobacco Use    Smoking status: Never Smoker    Smokeless tobacco: Never Used   Substance Use Topics    Alcohol use: Never       Frequency: Never            Review of Systems   Constitutional: Positive for activity change, appetite change, fatigue and fever. Negative for chills and unexpected weight change. HENT: Positive for congestion and postnasal drip. Negative for sinus pressure, sore throat and trouble swallowing. Respiratory: Positive for cough, chest tightness and shortness of breath. Negative for wheezing. Cardiovascular: Negative for chest pain, palpitations and leg swelling. Gastrointestinal: Positive for abdominal distention, abdominal pain and diarrhea. Negative for blood in stool, constipation, nausea and vomiting. Genitourinary: Positive for frequency and urgency. Negative for difficulty urinating, dysuria and hematuria. Musculoskeletal: Positive for back pain. Negative for arthralgias and myalgias. Skin: Negative for color change. Neurological: Negative for dizziness, seizures, syncope, light-headedness and headaches. Hematological: Bruises/bleeds easily. Psychiatric/Behavioral: Negative for decreased concentration, dysphoric mood and sleep disturbance. The patient is not nervous/anxious.          Objective:   Physical Exam  Vitals signs reviewed. Constitutional:       General: She is not in acute distress. Appearance: She is well-developed. HENT:      Head: Normocephalic and atraumatic. Eyes:      General: No scleral icterus. Conjunctiva/sclera: Conjunctivae normal.      Pupils: Pupils are equal, round, and reactive to light. Neck:      Musculoskeletal: Normal range of motion and neck supple. Thyroid: No thyromegaly. Trachea: No tracheal deviation. Cardiovascular:      Rate and Rhythm: Normal rate and regular rhythm. Heart sounds: Normal heart sounds. No murmur. Pulmonary:      Effort: Pulmonary effort is normal.      Breath sounds: Normal breath sounds. No wheezing or rales. Abdominal:      General: Bowel sounds are normal. There is no distension. Palpations: Abdomen is soft. There is no mass. Tenderness: There is no abdominal tenderness. Musculoskeletal: Normal range of motion. Skin:     General: Skin is warm and dry. Neurological:      Mental Status: She is alert and oriented to person, place, and time. Psychiatric:         Behavior: Behavior normal.         Thought Content: Thought content normal.         Judgment: Judgment normal.            Assessment:   1. Symptomatic gallstones. I believe she would be symptomatically improved with cholecystectomy. Plan:   1. Proceed with laparoscopic cholecystectomy. The rationale for the procedure was explained. Risks, benefits, alternatives and expected recovery were reviewed. Occasional need to convert to an open procedure was also discussed. Questions were encouraged and answered to the patient's satisfaction. Following this she wishes to proceed to surgery and will work with the office to schedule accordingly.

## 2020-01-30 NOTE — H&P
Neck:      Musculoskeletal: Normal range of motion and neck supple. Thyroid: No thyromegaly. Trachea: No tracheal deviation. Cardiovascular:      Rate and Rhythm: Normal rate and regular rhythm. Heart sounds: Normal heart sounds. No murmur. Pulmonary:      Effort: Pulmonary effort is normal.      Breath sounds: Normal breath sounds. No wheezing or rales. Abdominal:      General: Bowel sounds are normal. There is no distension. Palpations: Abdomen is soft. There is no mass. Tenderness: There is no abdominal tenderness. Musculoskeletal: Normal range of motion. Skin:     General: Skin is warm and dry. Neurological:      Mental Status: She is alert and oriented to person, place, and time. Psychiatric:         Behavior: Behavior normal.         Thought Content: Thought content normal.         Judgment: Judgment normal.            Assessment:   1. Symptomatic gallstones. I believe she would be symptomatically improved with cholecystectomy. Plan:   1. Proceed with laparoscopic cholecystectomy. The rationale for the procedure was explained. Risks, benefits, alternatives and expected recovery were reviewed. Occasional need to convert to an open procedure was also discussed. Questions were encouraged and answered to the patient's satisfaction. Following this she wishes to proceed to surgery and will work with the office to schedule accordingly.

## 2020-02-03 ENCOUNTER — ANESTHESIA (OUTPATIENT)
Dept: OPERATING ROOM | Age: 50
End: 2020-02-03
Payer: COMMERCIAL

## 2020-02-03 ENCOUNTER — HOSPITAL ENCOUNTER (OUTPATIENT)
Age: 50
Setting detail: OUTPATIENT SURGERY
Discharge: HOME | End: 2020-02-03
Attending: SURGERY | Admitting: SURGERY
Payer: COMMERCIAL

## 2020-02-03 ENCOUNTER — ANESTHESIA EVENT (OUTPATIENT)
Dept: OPERATING ROOM | Age: 50
End: 2020-02-03
Payer: COMMERCIAL

## 2020-02-03 VITALS
HEIGHT: 67 IN | HEART RATE: 57 BPM | BODY MASS INDEX: 27.15 KG/M2 | WEIGHT: 173 LBS | SYSTOLIC BLOOD PRESSURE: 120 MMHG | TEMPERATURE: 98 F | DIASTOLIC BLOOD PRESSURE: 76 MMHG | RESPIRATION RATE: 16 BRPM | OXYGEN SATURATION: 97 %

## 2020-02-03 VITALS
DIASTOLIC BLOOD PRESSURE: 59 MMHG | OXYGEN SATURATION: 98 % | SYSTOLIC BLOOD PRESSURE: 104 MMHG | RESPIRATION RATE: 16 BRPM | TEMPERATURE: 98.1 F

## 2020-02-03 PROBLEM — K80.20 GALLSTONES: Status: ACTIVE | Noted: 2020-02-03

## 2020-02-03 LAB — HCG(URINE) PREGNANCY TEST: NEGATIVE

## 2020-02-03 PROCEDURE — 7100000010 HC PHASE II RECOVERY - FIRST 15 MIN: Performed by: SURGERY

## 2020-02-03 PROCEDURE — 6360000002 HC RX W HCPCS: Performed by: NURSE ANESTHETIST, CERTIFIED REGISTERED

## 2020-02-03 PROCEDURE — 3700000000 HC ANESTHESIA ATTENDED CARE: Performed by: SURGERY

## 2020-02-03 PROCEDURE — 2500000003 HC RX 250 WO HCPCS: Performed by: NURSE ANESTHETIST, CERTIFIED REGISTERED

## 2020-02-03 PROCEDURE — 2720000010 HC SURG SUPPLY STERILE: Performed by: SURGERY

## 2020-02-03 PROCEDURE — 3600000004 HC SURGERY LEVEL 4 BASE: Performed by: SURGERY

## 2020-02-03 PROCEDURE — 6360000002 HC RX W HCPCS: Performed by: ANESTHESIOLOGY

## 2020-02-03 PROCEDURE — 6370000000 HC RX 637 (ALT 250 FOR IP): Performed by: SURGERY

## 2020-02-03 PROCEDURE — 2580000003 HC RX 258: Performed by: SURGERY

## 2020-02-03 PROCEDURE — 88304 TISSUE EXAM BY PATHOLOGIST: CPT

## 2020-02-03 PROCEDURE — 2580000003 HC RX 258: Performed by: ANESTHESIOLOGY

## 2020-02-03 PROCEDURE — 3700000001 HC ADD 15 MINUTES (ANESTHESIA): Performed by: SURGERY

## 2020-02-03 PROCEDURE — 7100000000 HC PACU RECOVERY - FIRST 15 MIN: Performed by: SURGERY

## 2020-02-03 PROCEDURE — 3600000014 HC SURGERY LEVEL 4 ADDTL 15MIN: Performed by: SURGERY

## 2020-02-03 PROCEDURE — 47562 LAPAROSCOPIC CHOLECYSTECTOMY: CPT | Performed by: SURGERY

## 2020-02-03 PROCEDURE — 2500000003 HC RX 250 WO HCPCS: Performed by: SURGERY

## 2020-02-03 PROCEDURE — 2709999900 HC NON-CHARGEABLE SUPPLY: Performed by: SURGERY

## 2020-02-03 PROCEDURE — 84703 CHORIONIC GONADOTROPIN ASSAY: CPT

## 2020-02-03 PROCEDURE — 7100000001 HC PACU RECOVERY - ADDTL 15 MIN: Performed by: SURGERY

## 2020-02-03 RX ORDER — HYDROCODONE BITARTRATE AND ACETAMINOPHEN 5; 325 MG/1; MG/1
1 TABLET ORAL EVERY 4 HOURS PRN
Qty: 15 TABLET | Refills: 0 | Status: SHIPPED | OUTPATIENT
Start: 2020-02-03 | End: 2020-02-10

## 2020-02-03 RX ORDER — LABETALOL 20 MG/4 ML (5 MG/ML) INTRAVENOUS SYRINGE
5 EVERY 10 MIN PRN
Status: DISCONTINUED | OUTPATIENT
Start: 2020-02-03 | End: 2020-02-03 | Stop reason: HOSPADM

## 2020-02-03 RX ORDER — MEPERIDINE HYDROCHLORIDE 50 MG/ML
12.5 INJECTION INTRAMUSCULAR; INTRAVENOUS; SUBCUTANEOUS EVERY 5 MIN PRN
Status: DISCONTINUED | OUTPATIENT
Start: 2020-02-03 | End: 2020-02-03 | Stop reason: HOSPADM

## 2020-02-03 RX ORDER — ONDANSETRON 2 MG/ML
INJECTION INTRAMUSCULAR; INTRAVENOUS PRN
Status: DISCONTINUED | OUTPATIENT
Start: 2020-02-03 | End: 2020-02-03 | Stop reason: SDUPTHER

## 2020-02-03 RX ORDER — ROCURONIUM BROMIDE 10 MG/ML
INJECTION, SOLUTION INTRAVENOUS PRN
Status: DISCONTINUED | OUTPATIENT
Start: 2020-02-03 | End: 2020-02-03 | Stop reason: SDUPTHER

## 2020-02-03 RX ORDER — SUCCINYLCHOLINE/SOD CL,ISO/PF 100 MG/5ML
SYRINGE (ML) INTRAVENOUS PRN
Status: DISCONTINUED | OUTPATIENT
Start: 2020-02-03 | End: 2020-02-03 | Stop reason: SDUPTHER

## 2020-02-03 RX ORDER — DEXAMETHASONE SODIUM PHOSPHATE 10 MG/ML
INJECTION, SOLUTION INTRAMUSCULAR; INTRAVENOUS PRN
Status: DISCONTINUED | OUTPATIENT
Start: 2020-02-03 | End: 2020-02-03 | Stop reason: SDUPTHER

## 2020-02-03 RX ORDER — METOCLOPRAMIDE HYDROCHLORIDE 5 MG/ML
10 INJECTION INTRAMUSCULAR; INTRAVENOUS
Status: DISCONTINUED | OUTPATIENT
Start: 2020-02-03 | End: 2020-02-03 | Stop reason: HOSPADM

## 2020-02-03 RX ORDER — SODIUM CHLORIDE 0.9 % (FLUSH) 0.9 %
10 SYRINGE (ML) INJECTION PRN
Status: DISCONTINUED | OUTPATIENT
Start: 2020-02-03 | End: 2020-02-03 | Stop reason: HOSPADM

## 2020-02-03 RX ORDER — MORPHINE SULFATE 4 MG/ML
4 INJECTION, SOLUTION INTRAMUSCULAR; INTRAVENOUS EVERY 5 MIN PRN
Status: DISCONTINUED | OUTPATIENT
Start: 2020-02-03 | End: 2020-02-03 | Stop reason: HOSPADM

## 2020-02-03 RX ORDER — KETOROLAC TROMETHAMINE 30 MG/ML
INJECTION, SOLUTION INTRAMUSCULAR; INTRAVENOUS PRN
Status: DISCONTINUED | OUTPATIENT
Start: 2020-02-03 | End: 2020-02-03 | Stop reason: SDUPTHER

## 2020-02-03 RX ORDER — FENTANYL CITRATE 50 UG/ML
INJECTION, SOLUTION INTRAMUSCULAR; INTRAVENOUS PRN
Status: DISCONTINUED | OUTPATIENT
Start: 2020-02-03 | End: 2020-02-03 | Stop reason: SDUPTHER

## 2020-02-03 RX ORDER — SODIUM CHLORIDE, SODIUM LACTATE, POTASSIUM CHLORIDE, CALCIUM CHLORIDE 600; 310; 30; 20 MG/100ML; MG/100ML; MG/100ML; MG/100ML
INJECTION, SOLUTION INTRAVENOUS CONTINUOUS
Status: DISCONTINUED | OUTPATIENT
Start: 2020-02-03 | End: 2020-02-03 | Stop reason: HOSPADM

## 2020-02-03 RX ORDER — SODIUM CHLORIDE 0.9 % (FLUSH) 0.9 %
10 SYRINGE (ML) INJECTION EVERY 12 HOURS SCHEDULED
Status: DISCONTINUED | OUTPATIENT
Start: 2020-02-03 | End: 2020-02-03 | Stop reason: HOSPADM

## 2020-02-03 RX ORDER — SCOLOPAMINE TRANSDERMAL SYSTEM 1 MG/1
1 PATCH, EXTENDED RELEASE TRANSDERMAL ONCE
Status: DISCONTINUED | OUTPATIENT
Start: 2020-02-03 | End: 2020-02-03 | Stop reason: HOSPADM

## 2020-02-03 RX ORDER — HYDROCODONE BITARTRATE AND ACETAMINOPHEN 5; 325 MG/1; MG/1
1 TABLET ORAL PRN
Status: COMPLETED | OUTPATIENT
Start: 2020-02-03 | End: 2020-02-03

## 2020-02-03 RX ORDER — FENTANYL CITRATE 50 UG/ML
50 INJECTION, SOLUTION INTRAMUSCULAR; INTRAVENOUS
Status: DISCONTINUED | OUTPATIENT
Start: 2020-02-03 | End: 2020-02-03 | Stop reason: HOSPADM

## 2020-02-03 RX ORDER — LIDOCAINE HYDROCHLORIDE 10 MG/ML
1 INJECTION, SOLUTION EPIDURAL; INFILTRATION; INTRACAUDAL; PERINEURAL
Status: DISCONTINUED | OUTPATIENT
Start: 2020-02-03 | End: 2020-02-03 | Stop reason: HOSPADM

## 2020-02-03 RX ORDER — MORPHINE SULFATE 4 MG/ML
2 INJECTION, SOLUTION INTRAMUSCULAR; INTRAVENOUS EVERY 5 MIN PRN
Status: DISCONTINUED | OUTPATIENT
Start: 2020-02-03 | End: 2020-02-03 | Stop reason: HOSPADM

## 2020-02-03 RX ORDER — PROMETHAZINE HYDROCHLORIDE 25 MG/ML
6.25 INJECTION, SOLUTION INTRAMUSCULAR; INTRAVENOUS
Status: DISCONTINUED | OUTPATIENT
Start: 2020-02-03 | End: 2020-02-03 | Stop reason: HOSPADM

## 2020-02-03 RX ORDER — BUPIVACAINE HYDROCHLORIDE 2.5 MG/ML
INJECTION, SOLUTION INFILTRATION; PERINEURAL PRN
Status: DISCONTINUED | OUTPATIENT
Start: 2020-02-03 | End: 2020-02-03 | Stop reason: ALTCHOICE

## 2020-02-03 RX ORDER — MIDAZOLAM HYDROCHLORIDE 1 MG/ML
INJECTION INTRAMUSCULAR; INTRAVENOUS PRN
Status: DISCONTINUED | OUTPATIENT
Start: 2020-02-03 | End: 2020-02-03 | Stop reason: SDUPTHER

## 2020-02-03 RX ORDER — MIDAZOLAM HYDROCHLORIDE 1 MG/ML
2 INJECTION INTRAMUSCULAR; INTRAVENOUS
Status: COMPLETED | OUTPATIENT
Start: 2020-02-03 | End: 2020-02-03

## 2020-02-03 RX ORDER — PROPOFOL 10 MG/ML
INJECTION, EMULSION INTRAVENOUS PRN
Status: DISCONTINUED | OUTPATIENT
Start: 2020-02-03 | End: 2020-02-03 | Stop reason: SDUPTHER

## 2020-02-03 RX ORDER — ENALAPRILAT 2.5 MG/2ML
1.25 INJECTION INTRAVENOUS
Status: DISCONTINUED | OUTPATIENT
Start: 2020-02-03 | End: 2020-02-03 | Stop reason: HOSPADM

## 2020-02-03 RX ORDER — LIDOCAINE HYDROCHLORIDE 10 MG/ML
INJECTION, SOLUTION EPIDURAL; INFILTRATION; INTRACAUDAL; PERINEURAL PRN
Status: DISCONTINUED | OUTPATIENT
Start: 2020-02-03 | End: 2020-02-03 | Stop reason: SDUPTHER

## 2020-02-03 RX ORDER — HYDRALAZINE HYDROCHLORIDE 20 MG/ML
5 INJECTION INTRAMUSCULAR; INTRAVENOUS EVERY 10 MIN PRN
Status: DISCONTINUED | OUTPATIENT
Start: 2020-02-03 | End: 2020-02-03 | Stop reason: HOSPADM

## 2020-02-03 RX ORDER — MORPHINE SULFATE 4 MG/ML
4 INJECTION, SOLUTION INTRAMUSCULAR; INTRAVENOUS
Status: DISCONTINUED | OUTPATIENT
Start: 2020-02-03 | End: 2020-02-03 | Stop reason: HOSPADM

## 2020-02-03 RX ORDER — HYDROCODONE BITARTRATE AND ACETAMINOPHEN 5; 325 MG/1; MG/1
2 TABLET ORAL PRN
Status: COMPLETED | OUTPATIENT
Start: 2020-02-03 | End: 2020-02-03

## 2020-02-03 RX ORDER — DIPHENHYDRAMINE HYDROCHLORIDE 50 MG/ML
12.5 INJECTION INTRAMUSCULAR; INTRAVENOUS
Status: DISCONTINUED | OUTPATIENT
Start: 2020-02-03 | End: 2020-02-03 | Stop reason: HOSPADM

## 2020-02-03 RX ADMIN — FENTANYL CITRATE 100 MCG: 50 INJECTION INTRAMUSCULAR; INTRAVENOUS at 10:24

## 2020-02-03 RX ADMIN — KETOROLAC TROMETHAMINE 30 MG: 30 INJECTION, SOLUTION INTRAMUSCULAR; INTRAVENOUS at 10:24

## 2020-02-03 RX ADMIN — MIDAZOLAM 2 MG: 1 INJECTION INTRAMUSCULAR; INTRAVENOUS at 09:11

## 2020-02-03 RX ADMIN — HYDROCODONE BITARTRATE AND ACETAMINOPHEN 1 TABLET: 5; 325 TABLET ORAL at 12:13

## 2020-02-03 RX ADMIN — SUGAMMADEX 200 MG: 100 INJECTION, SOLUTION INTRAVENOUS at 11:05

## 2020-02-03 RX ADMIN — MIDAZOLAM 2 MG: 1 INJECTION INTRAMUSCULAR; INTRAVENOUS at 10:16

## 2020-02-03 RX ADMIN — LIDOCAINE HYDROCHLORIDE 5 ML: 10 INJECTION, SOLUTION EPIDURAL; INFILTRATION; INTRACAUDAL; PERINEURAL at 10:24

## 2020-02-03 RX ADMIN — SODIUM CHLORIDE, SODIUM LACTATE, POTASSIUM CHLORIDE, AND CALCIUM CHLORIDE: 600; 310; 30; 20 INJECTION, SOLUTION INTRAVENOUS at 09:11

## 2020-02-03 RX ADMIN — PROPOFOL 150 MG: 10 INJECTION, EMULSION INTRAVENOUS at 10:24

## 2020-02-03 RX ADMIN — Medication 100 MG: at 10:24

## 2020-02-03 RX ADMIN — SODIUM CHLORIDE, SODIUM LACTATE, POTASSIUM CHLORIDE, AND CALCIUM CHLORIDE: 600; 310; 30; 20 INJECTION, SOLUTION INTRAVENOUS at 11:05

## 2020-02-03 RX ADMIN — ONDANSETRON HYDROCHLORIDE 4 MG: 2 INJECTION, SOLUTION INTRAMUSCULAR; INTRAVENOUS at 10:24

## 2020-02-03 RX ADMIN — ROCURONIUM BROMIDE 40 MG: 10 SOLUTION INTRAVENOUS at 10:28

## 2020-02-03 RX ADMIN — FENTANYL CITRATE 50 MCG: 50 INJECTION INTRAMUSCULAR; INTRAVENOUS at 10:50

## 2020-02-03 RX ADMIN — ROCURONIUM BROMIDE 10 MG: 10 SOLUTION INTRAVENOUS at 10:24

## 2020-02-03 RX ADMIN — SODIUM CHLORIDE, SODIUM LACTATE, POTASSIUM CHLORIDE, AND CALCIUM CHLORIDE: 600; 310; 30; 20 INJECTION, SOLUTION INTRAVENOUS at 10:16

## 2020-02-03 RX ADMIN — DEXAMETHASONE SODIUM PHOSPHATE 10 MG: 10 INJECTION, SOLUTION INTRAMUSCULAR; INTRAVENOUS at 10:24

## 2020-02-03 ASSESSMENT — PAIN SCALES - GENERAL
PAINLEVEL_OUTOF10: 4
PAINLEVEL_OUTOF10: 4

## 2020-02-03 ASSESSMENT — PAIN DESCRIPTION - LOCATION: LOCATION: ABDOMEN;INCISION

## 2020-02-03 ASSESSMENT — LIFESTYLE VARIABLES: SMOKING_STATUS: 0

## 2020-02-03 ASSESSMENT — PAIN DESCRIPTION - ORIENTATION: ORIENTATION: RIGHT

## 2020-02-03 ASSESSMENT — PAIN DESCRIPTION - PAIN TYPE: TYPE: SURGICAL PAIN

## 2020-02-03 ASSESSMENT — PAIN DESCRIPTION - DESCRIPTORS: DESCRIPTORS: ACHING

## 2020-02-03 NOTE — ANESTHESIA PRE PROCEDURE
POC Tests: No results for input(s): POCGLU, POCNA, POCK, POCCL, POCBUN, POCHEMO, POCHCT in the last 72 hours. Coags: No results found for: PROTIME, INR, APTT    HCG (If Applicable): No results found for: PREGTESTUR, PREGSERUM, HCG, HCGQUANT     ABGs: No results found for: PHART, PO2ART, PZB0LHI, UVT1SCI, BEART, Z3YZKIIC     Type & Screen (If Applicable):  No results found for: LABABO, [address]    Anesthesia Evaluation  Patient summary reviewed and Nursing notes reviewed no history of anesthetic complications:   Airway: Mallampati: II  TM distance: >3 FB   Neck ROM: full  Mouth opening: > = 3 FB Dental:          Pulmonary:normal exam        (-) not a current smoker                          ROS comment: Sinus infection treated by augmentin   Cardiovascular:    (+) hyperlipidemia      ECG reviewed               Beta Blocker:  Not on Beta Blocker         Neuro/Psych:      (-) seizures and CVA           GI/Hepatic/Renal:   (+) GERD: well controlled,           Endo/Other:    (+) hypothyroidism::., .    (-) diabetes mellitus               Abdominal:           Vascular: negative vascular ROS. Anesthesia Plan      general     ASA 2       Induction: intravenous. MIPS: Postoperative opioids intended and Prophylactic antiemetics administered. Anesthetic plan and risks discussed with patient.                       Ceferino Walden MD   2/3/2020

## 2020-02-03 NOTE — OP NOTE
SURGICAL DEPARTMENT REPORT    SURGEON:    Ayaz Butler MD    DATE OF SERVICE: 2/3/2020    PREOPERATIVE DIAGNOSIS  Symptomatic Gallstones    POSTOPERATIVE DIAGNOSIS  Symptomatic Gallstones    PROCEDURE  Laparoscopic Cholecystectomy    ASSISTANT  RICCO Morataya PA-S    INDICATIONS  Ms. Eugenio Ballard is a 44 y.o. female who has been having episodes of epigastric  and right upper quadrant pain. An evaluation was undertaken and an  abdominal ultrasound documents multiple gallstones. Following review of the  options for her care, she presents for elective laparoscopic cholecystectomy  to relieve her symptoms. PROCEDURE  Ms. Eugenio Ballard was taken to the main operating room and placed on the operating  table supine. After the induction of adequate general endotracheal anesthesia the  abdomen was prepped and draped to a sterile field. Time out was undertaken. A small incision was made in the inferior aspect of the umbilicus and through  this a Veress needle was inserted and pneumoperitoneum created. The Veress  needle was removed and replaced with a 10 mm port. The laparoscope was  advanced and inspection undertaken. There was no evidence of trauma from the  initial needle or trocar insertion. The liver was normal in appearance. The  gallbladder was not initially seen. Working ports were placed, a 5 mm in the epigastrium, followed by two 5 mm  ports along the right costal margin. Working instruments were advanced and  the fundus of the gallbladder was grasped and elevated. The neck was  grabbed, placed on stretch and the triangle of Calot opened. We began dissecting on  the neck of the gallbladder and dissected onto the cystic duct. This was cleared of  surrounding tissue. Adjacent to this the cystic artery was identified and  also cleared of surrounding tissue. The triangle of Calot was completely  dissected and an excellent critical view of safety achieved.       The cystic duct was doubly clipped, proximally and distally and divided. The  cystic artery was handled in a similar fashion. The gallbladder was then  released from the undersurface of the liver using cautery. Once free I  placed it in an Endocatch pouch and removed it through the umbilical incision  without problem. Gallbladder fossa was without bleeding. The cystic duct and cystic artery   stumps were well seen with clips across each and no evidence of bleeding or   bile leak. The working ports were removed under vision with no bleeding noted. The  pneumoperitoneum was released and the umbilical port removed. Fascia at the umbilicus was reapproximated with 0 Vicryl suture. Skin  incisions were closed with interrupted 4-0 Monocryl subcuticular suture  followed by Dermabond dressing. Sponge, needle, instrument count correct on 2 occasions. Estimated intraoperative blood loss, minimal.    Ms. Nicole Gallegos tolerated her surgery well and she was taken to PACU in  satisfactory condition.         ________________________________  Durga Amaya MD

## 2020-02-03 NOTE — PROGRESS NOTES
CLINICAL PHARMACY NOTE: MEDS TO [address] Select Patient?: No  Total # of Prescriptions Filled: 1   The following medications were delivered to the patient:  · Norco 5/325 mg  Total # of Interventions Completed: 0  Time Spent (min): 15    Additional Documentation:

## 2020-02-14 ENCOUNTER — OFFICE VISIT (OUTPATIENT)
Dept: SURGERY | Age: 50
End: 2020-02-14

## 2020-02-14 VITALS
DIASTOLIC BLOOD PRESSURE: 70 MMHG | HEIGHT: 67 IN | SYSTOLIC BLOOD PRESSURE: 106 MMHG | BODY MASS INDEX: 28.06 KG/M2 | WEIGHT: 178.8 LBS

## 2020-02-14 PROCEDURE — 99024 POSTOP FOLLOW-UP VISIT: CPT | Performed by: PHYSICIAN ASSISTANT

## 2020-02-14 RX ORDER — BROMPHENIRAMINE MALEATE, PSEUDOEPHEDRINE HYDROCHLORIDE, AND DEXTROMETHORPHAN HYDROBROMIDE 2; 30; 10 MG/5ML; MG/5ML; MG/5ML
SYRUP ORAL
COMMUNITY
Start: 2020-02-11 | End: 2020-09-10

## 2020-07-28 ENCOUNTER — OFFICE VISIT (OUTPATIENT)
Dept: OTOLARYNGOLOGY | Age: 50
End: 2020-07-28
Payer: COMMERCIAL

## 2020-07-28 VITALS
DIASTOLIC BLOOD PRESSURE: 80 MMHG | WEIGHT: 184 LBS | BODY MASS INDEX: 29.57 KG/M2 | HEIGHT: 66 IN | SYSTOLIC BLOOD PRESSURE: 122 MMHG

## 2020-07-28 PROCEDURE — 99202 OFFICE O/P NEW SF 15 MIN: CPT | Performed by: PHYSICIAN ASSISTANT

## 2020-07-28 NOTE — PROGRESS NOTES
right ear, she may require a myringotomy tube. Relevant Orders    CT SINUS WO CONTRAST          Orders Placed This Encounter   Procedures    CT SINUS WO CONTRAST     Scheduling Instructions:      schedule at [facility]     Order Specific Question:   Reason for exam:     Answer:   Right sided facial pain with right ear pain - effusion noted right ear       No orders of the defined types were placed in this encounter. Electronically signed by Musa Stockton PA-C on 7/29/20 at 10:06 AM CDT          Please note that this chart was generated using dragon dictation software. Although every effort was made to ensure the accuracy of this automated transcription, some errors in transcription may have occurred.

## 2020-07-29 PROBLEM — R51.9 RIGHT FACIAL PAIN: Status: ACTIVE | Noted: 2020-07-29

## 2020-07-29 PROBLEM — H69.91 EUSTACHIAN TUBE DISORDER, RIGHT: Status: ACTIVE | Noted: 2020-07-29

## 2020-07-29 ASSESSMENT — ENCOUNTER SYMPTOMS
EYE PAIN: 0
RHINORRHEA: 0
FACIAL SWELLING: 0
SINUS PAIN: 0
VOICE CHANGE: 0
EYE DISCHARGE: 0
TROUBLE SWALLOWING: 0
SINUS PRESSURE: 0
SORE THROAT: 0

## 2020-07-31 ENCOUNTER — HOSPITAL ENCOUNTER (OUTPATIENT)
Dept: CT IMAGING | Age: 50
Discharge: HOME | End: 2020-07-31
Payer: COMMERCIAL

## 2020-07-31 PROCEDURE — 70486 CT MAXILLOFACIAL W/O DYE: CPT

## 2020-08-24 ENCOUNTER — TELEPHONE (OUTPATIENT)
Dept: NEUROLOGY | Age: 50
End: 2020-08-24

## 2020-08-24 ENCOUNTER — OFFICE VISIT (OUTPATIENT)
Dept: OTOLARYNGOLOGY | Age: 50
End: 2020-08-24
Payer: COMMERCIAL

## 2020-08-24 VITALS
WEIGHT: 186 LBS | BODY MASS INDEX: 29.89 KG/M2 | DIASTOLIC BLOOD PRESSURE: 84 MMHG | SYSTOLIC BLOOD PRESSURE: 128 MMHG | HEIGHT: 66 IN

## 2020-08-24 PROBLEM — M27.40 MAXILLARY CYST: Status: ACTIVE | Noted: 2020-08-24

## 2020-08-24 PROCEDURE — 99243 OFF/OP CNSLTJ NEW/EST LOW 30: CPT | Performed by: OTOLARYNGOLOGY

## 2020-08-24 NOTE — ASSESSMENT & PLAN NOTE
Right-sided cyst/polyp at floor of right maxillary sinus. Other sinuses clear. No localized symptoms. Likely an incidental finding.   Patient informed these are found in 8- 12% of CT scans and typically require no therapy unless there are localized symptoms which she does not have

## 2020-08-24 NOTE — TELEPHONE ENCOUNTER
Called spoke with patient about an appointment , with Dr Coleen Hill, patient is aware of appointment, location , and phone number.

## 2020-08-24 NOTE — PROGRESS NOTES
44 y.o.  female presents today with intermittent right ear pain. This is been an ongoing problem for about 9 years. 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 Episodes consist mainly of a dull ache that can last for hours to all day long. Frequency varies as they can occur once or twice a week to once or twice a month. At the onset of her symptoms she was seen and evaluated and found to have no evidence of middle ear disease. She was eventually seen by an ENT who told her it was likely related to reflux. Over the years she has seen a dentist and had a bite guard made and has been treated numerous times for allergies. None of this is giving her any type of relief.     Family History   Problem Relation Age of Onset    Heart Disease Mother     Other Father         Natural causes     Social History     Socioeconomic History    Marital status:      Spouse name: None    Number of children: None    Years of education: None    Highest education level: None   Occupational History    None   Social Needs    Financial resource strain: None    Food insecurity     Worry: None     Inability: None    Transportation needs     Medical: None     Non-medical: None   Tobacco Use    Smoking status: Never Smoker    Smokeless tobacco: Never Used   Substance and Sexual Activity    Alcohol use: Never     Frequency: Never    Drug use: Never    Sexual activity: None   Lifestyle    Physical activity     Days per week: None     Minutes per session: None    Stress: None   Relationships    Social connections     Talks on phone: None     Gets together: None     Attends [employer] service: None     Active member of club or organization: None     Attends meetings of clubs or organizations: None     Relationship status: None    Intimate partner violence     Fear of current or ex partner: None     Emotionally abused: None     Physically no depression, anxiety or agitation    Assessment & Plan:    Problem List Items Addressed This Visit     Right facial pain     Mainly complains of otalgia. This is been an intermittent problem for about 9 years. She reports a rather dull pain that can last from hours to all day long. It is highly irregular in its occurrence and frequency. She can have 1 or 2 episodes a week or perhaps just 1 a month. She has been seen and evaluated by an ENT in the past and told that it was likely related to reflux. She has been seen and evaluated by a dentist sometime ago and was given a bite guard which she feels has not afforded her any relief. She has been treated numerous times over the years for sinus infections etc.  Recent sinus CT shows a right maxillary cyst which I feel has no bearing at all on her symptoms. He has a normal ear exam.  She still has her wisdom teeth but they appear to be in good repair. She is a non-smoker. I palpated nothing on bimanual exam of the submandibular region. Normal ear exam and hearing but rule out middle ear disease. TMJ would seem likely however she is not appreciably tender in the TMJ region and had received no benefit from a bite guard. She has not been to a dentist recently however her teeth appear to be in good repair although there is always a possibility there is some type of impaction related to her wisdom teeth. She is status post tonsillectomy as a teenager so it is unlikely she has any type of postsurgical neuropathy with the onset of symptoms being so far removed from her surgery. She had no tenderness on bimanual exam at the submandibular gland. If this was possibly related to any type of tumor it would have long since declared itself at this point given the longevity of her symptoms. My opinion is that it is likely some type of neuropathy.   She did state that she was diagnosed with spinal stenosis mainly at C4 and 5 a number of years ago but she has had no recent reevaluation. There is also the possibility that it is something akin to trigeminal neuralgia. In any event with a normal ear exam and normal hearing I do not feel this is any type of ENT problem and I will refer her to neurology for further evaluation         Relevant Orders    Joanne Garcia MD, Neurology, [place]          Orders Placed This Encounter   Procedures   Joanne Garcia MD, Neurology, [place]     Referral Priority:   Routine     Referral Type:   Eval and Treat     Referral Reason:   Specialty Services Required     Referred to Provider:   Rose Bell MD     Requested Specialty:   Neurology     Number of Visits Requested:   1       No orders of the defined types were placed in this encounter. Please note that this chart was generated using dragon dictation software. Although every effort was made to ensure the accuracy of this automated transcription, some errors in transcription may have occurred.

## 2020-08-24 NOTE — ASSESSMENT & PLAN NOTE
Mainly complains of otalgia. This is been an intermittent problem for about 9 years. She reports a rather dull pain that can last from hours to all day long. It is highly irregular in its occurrence and frequency. She can have 1 or 2 episodes a week or perhaps just 1 a month. She has been seen and evaluated by an ENT in the past and told that it was likely related to reflux. She has been seen and evaluated by a dentist sometime ago and was given a bite guard which she feels has not afforded her any relief. She has been treated numerous times over the years for sinus infections etc.  Recent sinus CT shows a right maxillary cyst which I feel has no bearing at all on her symptoms. He has a normal ear exam.  She still has her wisdom teeth but they appear to be in good repair. She is a non-smoker. I palpated nothing on bimanual exam of the submandibular region. Normal ear exam and hearing but rule out middle ear disease. TMJ would seem likely however she is not appreciably tender in the TMJ region and had received no benefit from a bite guard. She has not been to a dentist recently however her teeth appear to be in good repair although there is always a possibility there is some type of impaction related to her wisdom teeth. She is status post tonsillectomy as a teenager so it is unlikely she has any type of postsurgical neuropathy with the onset of symptoms being so far removed from her surgery. She had no tenderness on bimanual exam at the submandibular gland. If this was possibly related to any type of tumor it would have long since declared itself at this point given the longevity of her symptoms. My opinion is that it is likely some type of neuropathy. She did state that she was diagnosed with spinal stenosis mainly at C4 and 5 a number of years ago but she has had no recent reevaluation. There is also the possibility that it is something akin to trigeminal neuralgia.   In any event with a normal ear exam and normal hearing I do not feel this is any type of ENT problem and I will refer her to neurology for further evaluation

## 2020-09-10 ENCOUNTER — OFFICE VISIT (OUTPATIENT)
Dept: NEUROLOGY | Age: 50
End: 2020-09-10
Payer: COMMERCIAL

## 2020-09-10 ENCOUNTER — TELEPHONE (OUTPATIENT)
Dept: NEUROSURGERY | Age: 50
End: 2020-09-10

## 2020-09-10 VITALS
DIASTOLIC BLOOD PRESSURE: 87 MMHG | SYSTOLIC BLOOD PRESSURE: 118 MMHG | HEART RATE: 85 BPM | BODY MASS INDEX: 29.89 KG/M2 | WEIGHT: 186 LBS | HEIGHT: 66 IN

## 2020-09-10 PROBLEM — H92.01 RIGHT EAR PAIN: Status: ACTIVE | Noted: 2020-09-10

## 2020-09-10 PROCEDURE — 99204 OFFICE O/P NEW MOD 45 MIN: CPT | Performed by: PSYCHIATRY & NEUROLOGY

## 2020-09-10 NOTE — PROGRESS NOTES
Chief Complaint   Patient presents with    New Patient     Referred by Dr. Claire Dowling for facial pain        Reema Judd is a 44y.o. year old female who is seen for evaluation of right ear pain. 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 She felt like she had an ear infection but was not found to have one. She saw an ENT but had no clear etiology. She was treated for acid reflux with no clear benefit. Her pain is behind her right ear inside. The pain can come on several times a month with no clear triggers or exacerbating factors. The only thing the helps is if she puts her finger in her ear and elevates the ear. After a few hours the pain resolves. She denies any other symptoms or numbness, vertigo, diplopia or dysarthria. She denies any trauma. She does a issues with her neck in the past but that is not a significant issues.     Active Ambulatory Problems     Diagnosis Date Noted    Gallstones 02/03/2020    Right facial pain 07/29/2020    Eustachian tube disorder, right 07/29/2020    Maxillary cyst 08/24/2020    Right ear pain 09/10/2020     Resolved Ambulatory Problems     Diagnosis Date Noted    No Resolved Ambulatory Problems     Past Medical History:   Diagnosis Date    Hyperlipidemia        Past Surgical History:   Procedure Laterality Date    CHOLECYSTECTOMY, LAPAROSCOPIC N/A 2/3/2020    CHOLECYSTECTOMY LAPAROSCOPIC [facility]    DILATION AND CURETTAGE OF UTERUS  2011    THYROIDECTOMY      TONSILLECTOMY         Family History   Problem Relation Age of Onset    Heart Disease Mother     Other Father         Natural causes       No Known Allergies    Social History     Socioeconomic History    Marital status:      Spouse name: Not on file    Number of children: Not on file    Years of education: Not on file    Highest education level: Not on file   Occupational History    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, normal tone    Sensory Exam  Sensation intact to light touch and temperature upper and lower extremities bilaterally    Reflexes   2+ biceps bilaterally  2+ brachioradialis  2+patella  2+ ankle jerks  No clonus ankles  No Leyva's sign bilateral hands    Tremors- no tremors in hands or head noted    Gait  Normal base and speed  No ataxia    Coordination  Finger to nose-unremarkable    No results found for: [id number]  Lab Results   Component Value Date    WBC 7.6 12/25/2019    HGB 14.3 12/25/2019    HCT 43.2 12/25/2019    MCV 87.8 12/25/2019     12/25/2019     Lab Results   Component Value Date     12/25/2019    K 3.7 12/25/2019    CL 98 12/25/2019    CO2 24 12/25/2019    BUN 15 12/25/2019    CREATININE 0.8 12/25/2019    GLUCOSE 147 (H) 12/25/2019    CALCIUM 9.6 12/25/2019    PROT 7.6 12/25/2019    LABALBU 4.4 12/25/2019    BILITOT 0.3 12/25/2019    ALKPHOS 87 12/25/2019    AST 17 12/25/2019    ALT 13 12/25/2019    LABGLOM >60 12/25/2019           Assessment    ICD-10-CM    1. Right ear pain  H92.01        Her neurological examination today. Based upon her history and examination it is unclear exactly what the etiology of her pain is. Eustachian tube dysfunction would certainly be in the differential. The fact that her pain is relieved with elevation of the ear suggest a local pathology. At this time she was given a script for optic lidocaine solution to see if this helps. No further recommendations were provided. She is to follow up with me on a PRN basis and call with any other issues. Plan    No orders of the defined types were placed in this encounter. Orders Placed This Encounter   Medications    lidocaine (XYLOCAINE) 2 % jelly     Sig: Apply topically as needed. Dispense:  2 Bottle     Refill:  5       Return if symptoms worsen or fail to improve.       EMR Dragon/transcription disclaimer:Significant part of this  encounter note is electronic transcription/translation of spoken language to printed text. The electronic translation of spoken language may be erroneous, or at times, nonsensical words or phrases may be inadvertently transcribed.  Although I have reviewed the note for such errors, some may still exist.

## 2020-09-10 NOTE — TELEPHONE ENCOUNTER
Pharmacy called stated that the Lidocaine was going to be 77.00 and was not on formulary, is there a alternate medication for patient?  Please advise

## 2020-09-22 ENCOUNTER — HOSPITAL ENCOUNTER (OUTPATIENT)
Dept: WOMENS IMAGING | Age: 50
Discharge: HOME | End: 2020-09-22
Payer: COMMERCIAL

## 2020-09-22 PROCEDURE — 76642 ULTRASOUND BREAST LIMITED: CPT

## 2020-09-22 PROCEDURE — 77066 DX MAMMO INCL CAD BI: CPT

## 2021-03-03 ENCOUNTER — TRANSCRIBE ORDERS (OUTPATIENT)
Dept: PHYSICAL THERAPY | Facility: CLINIC | Age: 51
End: 2021-03-03

## 2021-03-03 DIAGNOSIS — M72.2 PLANTAR FASCIAL FIBROMATOSIS: Primary | ICD-10-CM

## 2021-03-09 ENCOUNTER — TREATMENT (OUTPATIENT)
Dept: PHYSICAL THERAPY | Facility: CLINIC | Age: 51
End: 2021-03-09

## 2021-03-09 DIAGNOSIS — M72.2 PLANTAR FASCIAL FIBROMATOSIS: Primary | ICD-10-CM

## 2021-03-09 PROCEDURE — 97161 PT EVAL LOW COMPLEX 20 MIN: CPT | Performed by: PHYSICAL THERAPIST

## 2021-03-09 NOTE — PROGRESS NOTES
Physical Therapy Initial Evaluation and Plan of Care      Patient: Cindy Robert   : 1970  Diagnosis/ICD-10 Code:  Plantar fascial fibromatosis [M72.2]  Referring practitioner: SPENCER Pulido  Date of Initial Visit: 3/9/2021  Today's Date: 3/9/2021  Patient seen for 1 sessions    Recheck due: 3/30/2021  MD appt: PRN         Subjective Questionnaire: LEFS: 56/80      Subjective Evaluation    History of Present Illness  Onset date: 2020.  Mechanism of injury: Pt presents with R foot/heel pain. Started in 2020. Got worse through December and finally went to MD. Was put in a boot at that time. Has pretty much been in the boot until ~3 weeks ago. States she still sometimes has to wear it when her pain gets too high. Most of her pain is at the bottom of her heel but she can have pain at lateral foot and into achilles. More at night, pain after after sitting for long periods.  Played with nephew a lot on  and was really sore yesterday and today. Hasn't iced it before. Only stretch she's been doing is a great toe stretch.      Patient Occupation: work from home; computer work; does a lot sitting Pain  Current pain ratin (heel and lateral foot; throb)  At best pain ratin  At worst pain ratin  Quality: dull ache and sharp  Relieving factors: change in position and rest  Aggravating factors: ambulation, prolonged positioning, repetitive movement, standing and movement    Social Support  Lives in: one-story house (1 small step)  Lives with: spouse    Diagnostic Tests  X-ray: abnormal (Bone spur at heel)    Treatments  Previous treatment: immobilization  Current treatment: medication  Current treatment comments: Naproxen.     Patient Goals  Patient goals for therapy: decreased edema, decreased pain, increased motion, return to sport/leisure activities and increased strength             Objective          Observations     Additional Ankle/Foot Observation Details  Very mild edema  present, mostly at dorsal foot. Mild forefoot pronation noted R>L without shoes.    Palpation     Right Tenderness of the lateral gastrocnemius and medial gastrocnemius.     Additional Palpation Details  TTP & taut at lateral>medial gastroc. Muscle knotting noted throughout.    Tenderness   Left Ankle/Foot   No tenderness.     Right Ankle/Foot   Tenderness in the Achilles insertion, medial calcaneus, mid-plantar aspect, plantar fascia and proximal Achilles.     Neurological Testing     Sensation     Ankle/Foot   Left Ankle/Foot   Intact: light touch    Right Ankle/Foot   Intact: light touch     Active Range of Motion   Left Ankle/Foot   Dorsiflexion (ke): 10 degrees   Plantar flexion: 58 degrees   Inversion: 35 degrees   Eversion: 30 degrees     Right Ankle/Foot   Dorsiflexion (ke): 5 degrees   Plantar flexion: 45 degrees   Inversion: 38 degrees   Eversion: 31 degrees     Additional Active Range of Motion Details  No pain with AROM. Reports tightness bilaterally with active DF.    Passive Range of Motion     Additional Passive Range of Motion Details  Passive ankle mobility WFL. Taut into dorsiflexion bilaterally    Joint Play   Left Ankle/Foot  Joints within functional limits are the subtalar joint. Hypomobile in the talocrural joint and forefoot.     Comments  Left talocrural joint comments: Mild. Left forefoot comments: Mild.     Strength/Myotome Testing     Left Ankle/Foot   Dorsiflexion: 5  Plantar flexion: 5  Inversion: 5  Eversion: 5    Right Ankle/Foot   Dorsiflexion: 4+  Plantar flexion: 4  Inversion: 4+  Eversion: 4+    Additional Strength Details  No pain    Ambulation     Comments   Ambulates with non-antalgic gait this date. No boot. Wearing supportive shoes.    Functional Assessment     Single Leg Stance   Left: 30 seconds  Right: 16 seconds     General Comments     Ankle/Foot Comments   Tight at gastroc/soleus bilaterally R>L. Taut at plantar fascia on R.         Assessment & Plan  "    Assessment  Impairments: abnormal gait, abnormal or restricted ROM, activity intolerance, impaired balance, impaired physical strength, lacks appropriate home exercise program and pain with function  Assessment details: Pt is a 51 y.o. female presenting with chronic R foot/heel pain that is limiting performance of functional mobility and daily activities. Pt has been in a walking boot for about 2 months, has been out of it for about 3 weeks. Pt states she does still wear it if her pain gets too bad. Signs/symptoms consistent with plantar fasciitis. Pt very TTP and taut at plantar fascia/insertion. Tightness into gastroc as well. Muscle knotting noted throughout, more at lateral vs medial head. Pt does have mild limitations in DF AROM on right, mostly due to limited flexibility and pain. Pt has not had any PT since her pain began and did well with small HEP review. Pt also demonstrates impaired ankle stability & strength. Pt will benefit from skilled PT services to address these deficits for improvements in functional ankle ROM, ankle strength/stability, flexibility, balance/proprioception, and tolerance to daily functional activities and mobility.  Prognosis: good  Functional Limitations: sleeping, walking, uncomfortable because of pain, standing and unable to perform repetitive tasks  Goals  Plan Goals: STG's by 3/30/2021  1) Demonstrate independence/compliance in performance of initial HEP  2) Demonstrate improved R ankle DF AROM to 10 deg or greater  3) Demonstrate improved R ankle PF AROM to 55 deg or greater  4) Subjectively report going 2 weeks without wearing boot with functional activities    LTG's by 4/20/2021  1) Subjectively report 60% overall improvement or greater  2) Improve LEFS score to 67/80 or greater  3) Demonstrate improved R ankle MMT to 5/5 in all planes  4) Perform R SLS on airex for 30\" with good LE control, finger touch on rail as needed  5) Tolerate 20 minutes or greater of " balance/proprioception and strengthening activities without increased pain    Plan  Therapy options: will be seen for skilled physical therapy services  Planned modality interventions: cryotherapy and ultrasound  Planned therapy interventions: balance/weight-bearing training, flexibility, functional ROM exercises, home exercise program, manual therapy, neuromuscular re-education, soft tissue mobilization, stretching, strengthening and therapeutic activities  Duration in visits: 12  Treatment plan discussed with: patient  Plan details: Focus on gastroc/soleus stretching, plantar fascia stretching. Ankle AROM - try wobble board next visit. Progress towards balance/proprioception and ankle stabilization activities. Continue soft tissue work to plantar fascia, achilles, and gastroc.        Timed:  Manual Therapy:         mins  84551;  Therapeutic Exercise:         mins  00861;     Neuromuscular Davis:        mins  94478;    Therapeutic Activity:          mins  26404;     Gait Training:           mins  06019;     Ultrasound:          mins  77487;    Electrical Stimulation:         mins  44534 ( );  Dry Needling:                     mins  self-pay;    Untimed:  Electrical Stimulation:         mins  11398 ( );  Mechanical Traction:         mins  16227;     Timed Treatment:      mins   Total Treatment:     42   mins    PT SIGNATURE: Miya Solares, PT   DATE TREATMENT INITIATED: 3/9/2021    Initial Certification  Certification Period: 6/7/2021  I certify that the therapy services are furnished while this patient is under my care.  The services outlined above are required by this patient, and will be reviewed every 90 days.     PHYSICIAN: Debbie Rick PA      DATE:     Please sign and return via fax to [phone].. Thank you, [facility].

## 2021-03-16 ENCOUNTER — TREATMENT (OUTPATIENT)
Dept: PHYSICAL THERAPY | Facility: CLINIC | Age: 51
End: 2021-03-16

## 2021-03-16 DIAGNOSIS — M72.2 PLANTAR FASCIAL FIBROMATOSIS: Primary | ICD-10-CM

## 2021-03-16 PROCEDURE — 97140 MANUAL THERAPY 1/> REGIONS: CPT | Performed by: PHYSICAL THERAPIST

## 2021-03-16 PROCEDURE — 97110 THERAPEUTIC EXERCISES: CPT | Performed by: PHYSICAL THERAPIST

## 2021-03-16 PROCEDURE — 97112 NEUROMUSCULAR REEDUCATION: CPT | Performed by: PHYSICAL THERAPIST

## 2021-03-16 NOTE — PROGRESS NOTES
"   Physical Therapy Daily Treatment Note      Patient: Cindy Robert   : 1970  Referring practitioner: SPENCER Pulido  Date of Initial Visit: Type: THERAPY  Noted: 3/9/2021  Today's Date: 3/16/2021  Patient seen for 2 sessions    Recheck due: 3/30/2021  MD appt: YOUNG       Cindy Robert reports: \"things are starting to get better\"      Subjective Evaluation    History of Present Illness    Subjective comment: Pt states the exercises are going pretty well and she does them consistently. Foot doesn't feel as tight. Most bothersome pain is still at her heel.Pain  Current pain ratin           Objective          Palpation     Additional Palpation Details  TTP/taut at lateral > medial gastroc on R.    Ambulation     Comments   Non-antalgic gait with no AD.      See Exercise, Manual, and Modality Logs for complete treatment.       Assessment & Plan     Assessment  Assessment details: Pt did well with all therex activities. Did to progress to some gentle standing proprioception. Pt was challenged with wobble board in regards to control, more with inversion/eversion and CW/CCW [place]. Continues to respond well to manual. TTP/taut at lateral gastroc. Still quite TTP at calcaneus/plantar fascia insertion. Updated HEP to include tband ankle 4-way  Prognosis: good    Goals  Plan Goals: STG's by 3/30/2021  1) Demonstrate independence/compliance in performance of initial HEP  2) Demonstrate improved R ankle DF AROM to 10 deg or greater  3) Demonstrate improved R ankle PF AROM to 55 deg or greater  4) Subjectively report going 2 weeks without wearing boot with functional activities    LTG's by 2021  1) Subjectively report 60% overall improvement or greater  2) Improve LEFS score to 67/80 or greater  3) Demonstrate improved R ankle MMT to 5/5 in all planes  4) Perform R SLS on airex for 30\" with good LE control, finger touch on rail as needed  5) Tolerate 20 minutes or greater of balance/proprioception and " strengthening activities without increased pain    Plan  Duration in visits: 12  Treatment plan discussed with: patient  Plan details: Continue wobble board and standing proprioception/stability activities as tolerated. Continue manual activities and soft tissue work.        Visit Diagnoses:    ICD-10-CM ICD-9-CM   1. Plantar fascial fibromatosis  M72.2 728.71       Progress per Plan of Care and Progress strengthening /stabilization /functional activity           Timed:  Manual Therapy:    12     mins  99265;  Therapeutic Exercise:    18    mins  98403;     Neuromuscular Davis:    15    mins  03217;    Therapeutic Activity:          mins  30034;     Gait Training:           mins  91648;     Ultrasound:          mins  92644;    Electrical Stimulation:         mins  84990 ( );    Untimed:  Electrical Stimulation:         mins  59262 ( );  Mechanical Traction:         mins  88721;     Timed Treatment:   45   mins   Total Treatment:     45   mins  Miya Solares, PT  Physical Therapist

## 2021-03-18 ENCOUNTER — TREATMENT (OUTPATIENT)
Dept: PHYSICAL THERAPY | Facility: CLINIC | Age: 51
End: 2021-03-18

## 2021-03-18 DIAGNOSIS — M72.2 PLANTAR FASCIAL FIBROMATOSIS: Primary | ICD-10-CM

## 2021-03-18 PROCEDURE — 97110 THERAPEUTIC EXERCISES: CPT | Performed by: PHYSICAL THERAPIST

## 2021-03-18 PROCEDURE — 97140 MANUAL THERAPY 1/> REGIONS: CPT | Performed by: PHYSICAL THERAPIST

## 2021-03-18 NOTE — PROGRESS NOTES
"   Physical Therapy Daily Progress Note      Patient: Cindy Robert   : 1970  Referring practitioner: SPENCER Pulido  Date of Initial Visit: Type: THERAPY  Noted: 3/9/2021  Today's Date: 3/18/2021  Patient seen for 3 sessions    Recheck due: 3/30/2021  MD appt: YOUNG     Cindy Robert reports:       Subjective Evaluation    History of Present Illness    Subjective comment: pt states that her foot is pretty good today but has not been up on her feet much.Pain  Current pain ratin           Objective           General Comments     Ankle/Foot Comments   Multiple knots calf lateral >medial; (-)soft tissue crepitus during manual with plantar fascia; significant crepitus knees with mini-squats but not painful per pt.     See Exercise, Manual, and Modality Logs for complete treatment.       Assessment & Plan     Assessment  Assessment details: Pt did very well with all therex; appears compliant to HEP; better performance of wobble board. Cues needed for correct form with mini-squats.  Prognosis: good    Goals  Plan Goals: STG's by 3/30/2021  1) Demonstrate independence/compliance in performance of initial HEP  2) Demonstrate improved R ankle DF AROM to 10 deg or greater  3) Demonstrate improved R ankle PF AROM to 55 deg or greater  4) Subjectively report going 2 weeks without wearing boot with functional activities    LTG's by 2021  1) Subjectively report 60% overall improvement or greater  2) Improve LEFS score to 67/80 or greater  3) Demonstrate improved R ankle MMT to 5/5 in all planes  4) Perform R SLS on airex for 30\" with good LE control, finger touch on rail as needed  5) Tolerate 20 minutes or greater of balance/proprioception and strengthening activities without increased pain    Plan  Duration in visits: 12  Treatment plan discussed with: patient  Plan details: Lat step up; stand hip abd and ext bilateral with DF; instruct pt on standing gastroc/soleus stretches for HEP; update HEP as " indicated; Continue manual activities and soft tissue work.        Visit Diagnoses:    ICD-10-CM ICD-9-CM   1. Plantar fascial fibromatosis  M72.2 728.71       Progress per Plan of Care           Timed:  Manual Therapy:    10     mins  29628;  Therapeutic Exercise:    35     mins  15108;     Neuromuscular Davis:        mins  09042;    Therapeutic Activity:          mins  89597;     Gait Training:           mins  11623;     Ultrasound:          mins  49591;    Electrical Stimulation:         mins  09312 ( );    Untimed:  Electrical Stimulation:         mins  47263 ( );  Mechanical Traction:         mins  10645;  Paraffin                               mins  83929;     Timed Treatment:   45   mins   Total Treatment:     45   mins  Camille Guerra PTA  Physical Therapist

## 2021-03-24 ENCOUNTER — TREATMENT (OUTPATIENT)
Dept: PHYSICAL THERAPY | Facility: CLINIC | Age: 51
End: 2021-03-24

## 2021-03-24 DIAGNOSIS — M72.2 PLANTAR FASCIAL FIBROMATOSIS: Primary | ICD-10-CM

## 2021-03-24 PROCEDURE — 97110 THERAPEUTIC EXERCISES: CPT | Performed by: PHYSICAL THERAPIST

## 2021-03-24 PROCEDURE — 97140 MANUAL THERAPY 1/> REGIONS: CPT | Performed by: PHYSICAL THERAPIST

## 2021-03-24 NOTE — PROGRESS NOTES
"   Physical Therapy Daily Treatment Note      Patient: Cindy Robert   : 1970  Referring practitioner: SPENCER Pulido  Date of Initial Visit: Type: THERAPY  Noted: 3/9/2021  Today's Date: 3/24/2021  Patient seen for 4 sessions    Recheck due: 3/30/2021  MD appt: YOUNG       Cindy Robert reports: \"can tell I'm making some progress\"      Subjective Evaluation    History of Present Illness    Subjective comment: Pt states her foot is feeling good again today. States the past few nights she hasn't had hardly any pain in her heel at night time, which is a good improvement.Pain  Current pain ratin           Objective          Observations     Additional Ankle/Foot Observation Details  Improved control with wobble board activities.    Ambulation     Comments   Non-antalgic gait with no AD.      See Exercise, Manual, and Modality Logs for complete treatment.       Assessment & Plan     Assessment  Assessment details: Good tolerance/perforamcne with all treatment activities. Updated HEP to include standing gastroc/soleus stretch at wall. Did well with introduction to airex beam activities to challenge proprioception/stabiltiy. Improved control with wobble board activities. Continues with tightness/knots at lateral>medial gastroc but less restriction with manual.  Prognosis: good    Goals  Plan Goals: STG's by 3/30/2021  1) Demonstrate independence/compliance in performance of initial HEP  2) Demonstrate improved R ankle DF AROM to 10 deg or greater  3) Demonstrate improved R ankle PF AROM to 55 deg or greater  4) Subjectively report going 2 weeks without wearing boot with functional activities    LTG's by 2021  1) Subjectively report 60% overall improvement or greater  2) Improve LEFS score to 67/80 or greater  3) Demonstrate improved R ankle MMT to 5/5 in all planes  4) Perform R SLS on airex for 30\" with good LE control, finger touch on rail as needed  5) Tolerate 20 minutes or greater of " balance/proprioception and strengthening activities without increased pain    Plan  Duration in visits: 12  Treatment plan discussed with: patient  Plan details: Recheck next week. Continue airex beam activities. SLS stance next visit & maybe BAPS board standing?        Visit Diagnoses:    ICD-10-CM ICD-9-CM   1. Plantar fascial fibromatosis  M72.2 728.71       Progress per Plan of Care and Progress strengthening /stabilization /functional activity           Timed:  Manual Therapy:    10     mins  11018;  Therapeutic Exercise:    37     mins  75833;     Neuromuscular Davis:        mins  23659;    Therapeutic Activity:          mins  17112;     Gait Training:           mins  76333;     Ultrasound:          mins  30069;    Electrical Stimulation:         mins  30651 ( );    Untimed:  Electrical Stimulation:         mins  61849 ( );  Mechanical Traction:         mins  05671;     Timed Treatment:   47   mins   Total Treatment:     47   mins  Miya Solares, PT  Physical Therapist

## 2021-03-30 ENCOUNTER — TREATMENT (OUTPATIENT)
Dept: PHYSICAL THERAPY | Facility: CLINIC | Age: 51
End: 2021-03-30

## 2021-03-30 DIAGNOSIS — M72.2 PLANTAR FASCIAL FIBROMATOSIS: Primary | ICD-10-CM

## 2021-03-30 PROCEDURE — 97110 THERAPEUTIC EXERCISES: CPT | Performed by: PHYSICAL THERAPIST

## 2021-03-30 PROCEDURE — 97112 NEUROMUSCULAR REEDUCATION: CPT | Performed by: PHYSICAL THERAPIST

## 2021-03-30 PROCEDURE — 97140 MANUAL THERAPY 1/> REGIONS: CPT | Performed by: PHYSICAL THERAPIST

## 2021-03-30 NOTE — PROGRESS NOTES
"   Physical Therapy Daily Progress Note      Patient: Cindy Robert   : 1970  Referring practitioner: SPENCER Pulido  Date of Initial Visit: Type: THERAPY  Noted: 3/9/2021  Today's Date: 3/30/2021  Patient seen for 5 sessions    Recheck due: 3/30/2021  MD appt: YOUNG     Cindy Robert reports: \"beginning to tell a difference and I was skeptical about it\"  Subjective     Objective           General Comments     Ankle/Foot Comments   Controlled eccentric step down, crepitus knee but not incr pain at that time     See Exercise, Manual, and Modality Logs for complete treatment.       Assessment & Plan     Assessment  Assessment details: Satisfactory progress so far and subjective improvement noted as well; BAPS was performed ok but some incr in pn noted so didn't do too much. Able to progress tband resistance today.  Prognosis: good    Goals  Plan Goals: STG's by 3/30/2021  1) Demonstrate independence/compliance in performance of initial HEP  2) Demonstrate improved R ankle DF AROM to 10 deg or greater  3) Demonstrate improved R ankle PF AROM to 55 deg or greater  4) Subjectively report going 2 weeks without wearing boot with functional activities    LTG's by 2021  1) Subjectively report 60% overall improvement or greater  2) Improve LEFS score to 67/80 or greater  3) Demonstrate improved R ankle MMT to 5/5 in all planes  4) Perform R SLS on airex for 30\" with good LE control, finger touch on rail as needed  5) Tolerate 20 minutes or greater of balance/proprioception and strengthening activities without increased pain    Plan  Duration in visits: 12  Treatment plan discussed with: patient  Plan details: Recheck next visit; SLB; monitor post Rx(BAPS was difficult)        Visit Diagnoses:    ICD-10-CM ICD-9-CM   1. Plantar fascial fibromatosis  M72.2 728.71       Progress per Plan of Care           Timed:  Manual Therapy:    10     mins  25407;  Therapeutic Exercise:    25     mins  54244;   "   Neuromuscular Davis:    15    mins  75417;    Therapeutic Activity:          mins  65786;     Gait Training:           mins  07874;     Ultrasound:          mins  18852;    Electrical Stimulation:         mins  99608 ( );    Untimed:  Electrical Stimulation:         mins  13953 ( );  Mechanical Traction:         mins  22017;  Paraffin                               mins  15463;     Timed Treatment:   50   mins   Total Treatment:     50   mins  Camille Guerra PTA  Physical Therapist

## 2021-04-01 ENCOUNTER — TREATMENT (OUTPATIENT)
Dept: PHYSICAL THERAPY | Facility: CLINIC | Age: 51
End: 2021-04-01

## 2021-04-01 DIAGNOSIS — M72.2 PLANTAR FASCIAL FIBROMATOSIS: Primary | ICD-10-CM

## 2021-04-01 DIAGNOSIS — M51.26 DISPLACEMENT OF LUMBAR INTERVERTEBRAL DISC WITHOUT MYELOPATHY: ICD-10-CM

## 2021-04-01 PROCEDURE — 97140 MANUAL THERAPY 1/> REGIONS: CPT | Performed by: PHYSICAL THERAPIST

## 2021-04-01 PROCEDURE — 97110 THERAPEUTIC EXERCISES: CPT | Performed by: PHYSICAL THERAPIST

## 2021-04-01 PROCEDURE — 97112 NEUROMUSCULAR REEDUCATION: CPT | Performed by: PHYSICAL THERAPIST

## 2021-04-01 NOTE — PROGRESS NOTES
"   Physical Therapy Daily Treatment Note      Patient: Cindy Robert   : 1970  Referring practitioner: SPENCER Pulido  Date of Initial Visit: Type: THERAPY  Noted: 3/9/2021  Today's Date: 2021  Patient seen for 6 sessions    Recert due:  3-30-21  MD followup:  prlaura     Cindy Robert reports:   Subjective Questionnaire:       Subjective  Pt reports no longer having foot pain.  Hurts at posterior calcaneous and at times in lateral ankle.     Objective  Taut achilles tendon at insertion and lateral gastroc.  No significant TTP.      See Exercise, Manual, and Modality Logs for complete treatment.       Assessment & Plan     Assessment  Assessment details: Pt displays good form with therex.  Evidence of good compliance with HEP.  Tolerated new balance/proprio activites well.  Pain is now isolated to mostly achilles tendon and lateral ankle.      Goals  Plan Goals: Plan Goals: STG's by 3/30/2021  1) Demonstrate independence/compliance in performance of initial HEP  2) Demonstrate improved R ankle DF AROM to 10 deg or greater  3) Demonstrate improved R ankle PF AROM to 55 deg or greater  4) Subjectively report going 2 weeks without wearing boot with functional activities    LTG's by 2021  1) Subjectively report 60% overall improvement or greater  2) Improve LEFS score to 67/80 or greater  3) Demonstrate improved R ankle MMT to 5/5 in all planes  4) Perform R SLS on airex for 30\" with good LE control, finger touch on rail as needed  5) Tolerate 20 minutes or greater of balance/proprioception and strengthening activities without increased pain    Plan  Duration in visits: 12  Plan details: Increase reps with BAPS board.          Visit Diagnoses:    ICD-10-CM ICD-9-CM   1. Plantar fascial fibromatosis  M72.2 728.71   2. Displacement of lumbar intervertebral disc without myelopathy  M51.26 722.10                  Timed:  Manual Therapy:  15       mins  82866;  Therapeutic Exercise:   20      mins  05818;  "    Neuromuscular Davis:  10      mins  55074;    Therapeutic Activity:          mins  99803;     Gait Training:           mins  10245;     Ultrasound:          mins  43709;    Electrical Stimulation:         mins  92443 ( );    Untimed:  Electrical Stimulation:         mins  28284 ( );  Mechanical Traction:         mins  34697;     Timed Treatment:  45    mins   Total Treatment:    45  mins  [facility]  Physical Therapist Assistant

## 2021-04-08 ENCOUNTER — TREATMENT (OUTPATIENT)
Dept: PHYSICAL THERAPY | Facility: CLINIC | Age: 51
End: 2021-04-08

## 2021-04-08 DIAGNOSIS — M72.2 PLANTAR FASCIAL FIBROMATOSIS: Primary | ICD-10-CM

## 2021-04-08 PROCEDURE — 97112 NEUROMUSCULAR REEDUCATION: CPT | Performed by: PHYSICAL THERAPIST

## 2021-04-08 PROCEDURE — 97110 THERAPEUTIC EXERCISES: CPT | Performed by: PHYSICAL THERAPIST

## 2021-04-08 PROCEDURE — 97140 MANUAL THERAPY 1/> REGIONS: CPT | Performed by: PHYSICAL THERAPIST

## 2021-04-08 NOTE — PROGRESS NOTES
Re-Assessment / Re-Certification      Patient: Cindy Robert   : 1970  Diagnosis/ICD-10 Code:  Plantar fascial fibromatosis [M72.2]  Referring practitioner: SPENCER Pulido  Date of Initial Visit: Type: THERAPY  Noted: 3/9/2021  Today's Date: 2021  Patient seen for 7 sessions    Recheck due: 2021  MD appt: PRN    Subjective:   Cindy Robert reports: 80% improvement  Subjective Questionnaire: LEFS: 65/80 (56/80 at initial eval)  Clinical Progress: improved  Home Program Compliance: Yes  Treatment has included: therapeutic exercise, neuromuscular re-education, manual therapy and cryotherapy    Subjective Evaluation    History of Present Illness    Subjective comment: Pt states she's getting much better. Heel isn't hurting as bad and can tolerate more walking and functional activities with less pain. Has been compliant with HEP. Reports 80% improvement. Has even bought good supportive tennis shoes and sandals which are helping as well.Pain  No pain reported         Objective          Observations     Additional Ankle/Foot Observation Details  No edema noted. Mild forefoot pronation noted R>L without shoes.    Palpation     Right Tenderness of the lateral gastrocnemius and medial gastrocnemius.     Additional Palpation Details  TTP & taut at lateral>medial gastroc. Improved muscle tightness/knotting noted at lateral gasctroc    Tenderness   Left Ankle/Foot   No tenderness.     Right Ankle/Foot   Tenderness in the plantar fascia. No tenderness in the Achilles insertion, medial calcaneus, mid-plantar aspect and proximal Achilles.     Neurological Testing     Sensation     Ankle/Foot   Left Ankle/Foot   Intact: light touch    Right Ankle/Foot   Intact: light touch     Active Range of Motion   Left Ankle/Foot   Dorsiflexion (ke): 10 degrees   Plantar flexion: 59 degrees   Inversion: 35 degrees   Eversion: 30 degrees     Right Ankle/Foot   Dorsiflexion (ke): 10 degrees   Plantar flexion: 56 degrees  "  Inversion: 38 degrees   Eversion: 33 degrees     Passive Range of Motion     Additional Passive Range of Motion Details  Passive ankle mobility WFL.    Joint Play   Left Ankle/Foot  Joints within functional limits are the subtalar joint and forefoot. Hypomobile in the talocrural joint.     Comments  Left talocrural joint comments: Mild.     Strength/Myotome Testing     Left Ankle/Foot   Dorsiflexion: 5  Plantar flexion: 5  Inversion: 5  Eversion: 5    Right Ankle/Foot   Dorsiflexion: 5  Plantar flexion: 4+  Inversion: 5  Eversion: 5    Additional Strength Details  No pain    Ambulation     Comments   Ambulates with non-antalgic gait this date. Wearing good supportive shoes.    Functional Assessment     Single Leg Stance   Left: 30 seconds  Right: 30 seconds    Comments  - Airex L SLS: 21\" with increased ankle strategy  - Better control at L ankle with standing BAPS board ROM     General Comments     Ankle/Foot Comments   Tight at gastroc/soleus bilaterally R>L. Taut at plantar fascia on R. Improvements noted overall.     Assessment & Plan     Assessment  Impairments: abnormal gait, abnormal or restricted ROM, activity intolerance, impaired balance, impaired physical strength, lacks appropriate home exercise program and pain with function  Assessment details: Pt is progressing very well overall with PT as evidenced by improvements in functional ankle AROM, ankle strength/stability, and functional activity tolerance. Pt much less TTP at plantar fascia and into insertion at calcaneus and pt reports improved pain levels in these areas with standing, walking, & functional activities. Good improvements in ankle DF AROM as gastroc flexibility/tightness has improved. Pt is doing well with proprioception/stabilization activities. Better dynamic control noted at L ankle with BAPS board ROM and pt is able to tolerate increased therex intensity. Pt continues with TTP and muscle tightness at lateral gastroc but it has improved " "as noted by palpation. Will continue to see pt for 2-3 more weeks to further improve dynamic stability/proprioception & functional activity tolerance and will d/c at that time.  Prognosis: good  Functional Limitations: sleeping, walking, uncomfortable because of pain, standing and unable to perform repetitive tasks  Goals  Plan Goals: STG's all met  1) Demonstrate independence/compliance in performance of initial HEP (met)  2) Demonstrate improved R ankle DF AROM to 10 deg or greater (met)  3) Demonstrate improved R ankle PF AROM to 55 deg or greater (met)  4) Subjectively report going 2 weeks without wearing boot with functional activities (met)    LTG's by 4/29/2021  1) Subjectively report 60% overall improvement or greater (met)  2) Improve LEFS score to 67/80 or greater (progressing)  3) Demonstrate improved R ankle MMT to 5/5 in all planes  (progressing)  4) Perform R SLS on airex for 30\" with good LE control, finger touch on rail as needed (progressing)  5) Tolerate 20 minutes or greater of balance/proprioception and strengthening activities without increased pain (progressing)    Plan  Therapy options: will be seen for skilled physical therapy services  Planned modality interventions: cryotherapy and ultrasound  Planned therapy interventions: balance/weight-bearing training, flexibility, functional ROM exercises, home exercise program, manual therapy, neuromuscular re-education, soft tissue mobilization, stretching, strengthening and therapeutic activities  Duration in visits: 6  Treatment plan discussed with: patient  Plan details: Will continue for 2-3 more weeks and d/c at that time to independent management. Continue BOSU step ups and dynamic balance/proprioception activities. Continue manual/soft tissue work prn. Try eccentric off step CR next. Could potentially progress to cybex leg press        Visit Diagnoses:    ICD-10-CM ICD-9-CM   1. Plantar fascial fibromatosis  M72.2 728.71       Progress toward " previous goals: Partially Met      Recommendations: Continue as planned  Timeframe: 3 weeks  Prognosis to achieve goals: good    PT Signature: Miya Solares PT      Based upon review of the patient's progress and continued therapy plan, it is my medical opinion that Cindy Robert should continue physical therapy treatment at [facility]  [address] [facility] [id number] [phone].    Signature: __________________________________  Debbie Rick PA    Timed:  Manual Therapy:    8     mins  58175;  Therapeutic Exercise:    15     mins  33564;     Neuromuscular Davis:    25    mins  88005;    Therapeutic Activity:          mins  83632;     Gait Training:           mins  10104;     Ultrasound:          mins  99484;    Electrical Stimulation:         mins  87050 ( );    Untimed:  Electrical Stimulation:         mins  84658 ( );  Mechanical Traction:         mins  87718;     Timed Treatment:   48   mins   Total Treatment:     48   mins

## 2021-04-15 ENCOUNTER — TREATMENT (OUTPATIENT)
Dept: PHYSICAL THERAPY | Facility: CLINIC | Age: 51
End: 2021-04-15

## 2021-04-15 DIAGNOSIS — M72.2 PLANTAR FASCIAL FIBROMATOSIS: Primary | ICD-10-CM

## 2021-04-15 PROCEDURE — 97110 THERAPEUTIC EXERCISES: CPT | Performed by: PHYSICAL THERAPIST

## 2021-04-15 PROCEDURE — 97140 MANUAL THERAPY 1/> REGIONS: CPT | Performed by: PHYSICAL THERAPIST

## 2021-04-15 NOTE — PROGRESS NOTES
"   Physical Therapy Daily Treatment Note      Patient: Cindy Robert   : 1970  Referring practitioner: SPENCER Pulido  Date of Initial Visit: Type: THERAPY  Noted: 3/9/2021  Today's Date: 4/15/2021  Patient seen for 8 sessions    Recert due:  21  MD followup:  prn     Cindy Robert reports:   Subjective Questionnaire:       Subjective     Objective   See Exercise, Manual, and Modality Logs for complete treatment.       Assessment & Plan     Assessment  Assessment details: Pt reporting decreased pain.  Doing well with therex.  No c/o pain post RX.  Good ankle control on BAPS.  Min to no TTP of plantar fascia.      Goals  Plan Goals: Plan Goals: STG's all met  1) Demonstrate independence/compliance in performance of initial HEP (met)  2) Demonstrate improved R ankle DF AROM to 10 deg or greater (met)  3) Demonstrate improved R ankle PF AROM to 55 deg or greater (met)  4) Subjectively report going 2 weeks without wearing boot with functional activities (met)    LTG's by 2021  1) Subjectively report 60% overall improvement or greater (met)  2) Improve LEFS score to 67/80 or greater (progressing)  3) Demonstrate improved R ankle MMT to 5/5 in all planes  (progressing)  4) Perform R SLS on airex for 30\" with good LE control, finger touch on rail as needed (progressing)  5) Tolerate 20 minutes or greater of balance/proprioception and strengthening activities without increased pain (met)    Plan  Duration in visits: 6  Plan details: Try Airex SLS.          Visit Diagnoses:    ICD-10-CM ICD-9-CM   1. Plantar fascial fibromatosis  M72.2 728.71                  Timed:  Manual Therapy:   14      mins  33526;  Therapeutic Exercise:   32      mins  03226;     Neuromuscular Davis:        mins  65713;    Therapeutic Activity:          mins  46827;     Gait Training:           mins  63739;     Ultrasound:          mins  96707;    Electrical Stimulation:         mins  11480 ( );    Untimed:  Electrical " Stimulation:         mins  76933 ( );  Mechanical Traction:         mins  75271;     Timed Treatment:  46    mins   Total Treatment:    46    mins  Rosa Sarah PTA  Physical Therapist Assistant

## 2021-04-20 ENCOUNTER — TREATMENT (OUTPATIENT)
Dept: PHYSICAL THERAPY | Facility: CLINIC | Age: 51
End: 2021-04-20

## 2021-04-20 DIAGNOSIS — M72.2 PLANTAR FASCIAL FIBROMATOSIS: Primary | ICD-10-CM

## 2021-04-20 PROCEDURE — 97110 THERAPEUTIC EXERCISES: CPT | Performed by: PHYSICAL THERAPIST

## 2021-04-20 PROCEDURE — 97140 MANUAL THERAPY 1/> REGIONS: CPT | Performed by: PHYSICAL THERAPIST

## 2021-04-20 PROCEDURE — 97112 NEUROMUSCULAR REEDUCATION: CPT | Performed by: PHYSICAL THERAPIST

## 2021-04-20 NOTE — PROGRESS NOTES
"   Physical Therapy Daily Progress Note      Patient: Cindy Robert   : 1970  Referring practitioner: SPENCER Pulido  Date of Initial Visit: Type: THERAPY  Noted: 3/9/2021  Today's Date: 2021  Patient seen for 9 sessions    Recert due:  21  MD followup:  prn             Cindy Robert reports: 90% improved      Subjective Evaluation    History of Present Illness    Subjective comment: Pt states she worked in yard 2 hrs x 2 different days and did very well.Pain  No pain reported           Objective           General Comments     Ankle/Foot Comments   Mild soft tissue crepitus fascia and still some knottiness lat gastroc, but not TTP as deep soft tissue work     See Exercise, Manual, and Modality Logs for complete treatment.       Assessment & Plan     Assessment  Assessment details: Pt reported that she did great with a lot of yard work and incr subjective progress; also states she feels ready to be DC this wk from PT; loves her shoes and inserts and extremely pleased with progress. Updated HEP to green tband and advised to decr reps initially. Need to address unmet goals and advised final HEP then DC to I management. Met LTG 4,5 today.    Goals  Plan Goals: Plan Goals: STG's all met  1) Demonstrate independence/compliance in performance of initial HEP (met)  2) Demonstrate improved R ankle DF AROM to 10 deg or greater (met)  3) Demonstrate improved R ankle PF AROM to 55 deg or greater (met)  4) Subjectively report going 2 weeks without wearing boot with functional activities (met)    LTG's by 2021  1) Subjectively report 60% overall improvement or greater (met)  2) Improve LEFS score to 67/80 or greater (progressing)  3) Demonstrate improved R ankle MMT to 5/5 in all planes  (progressing)  4) Perform R SLS on airex for 30\" with good LE control, finger touch on rail as needed (met)  5) Tolerate 20 minutes or greater of balance/proprioception and strengthening activities without increased " pain (met)    Plan  Duration in visits: 6  Plan details: Address strength goals; finalize I HEP/mgt and DC        Visit Diagnoses:    ICD-10-CM ICD-9-CM   1. Plantar fascial fibromatosis  M72.2 728.71       Progress per Plan of Care, Progress strengthening /stabilization /functional activity and Anticipate DC next Visit           Timed:  Manual Therapy:    12     mins  58198;  Therapeutic Exercise:    20     mins  87243;     Neuromuscular Davis:    16    mins  74173;    Therapeutic Activity:          mins  11676;     Gait Training:           mins  76968;     Ultrasound:          mins  42945;    Electrical Stimulation:         mins  09725 ( );    Untimed:  Electrical Stimulation:         mins  60426 ( );  Mechanical Traction:         mins  64980;  Paraffin                               mins  33684;     Timed Treatment:   48   mins   Total Treatment:     48   mins  Camille Guerra PTA  Physical Therapist

## 2021-04-22 ENCOUNTER — TREATMENT (OUTPATIENT)
Dept: PHYSICAL THERAPY | Facility: CLINIC | Age: 51
End: 2021-04-22

## 2021-04-22 DIAGNOSIS — M72.2 PLANTAR FASCIAL FIBROMATOSIS: Primary | ICD-10-CM

## 2021-04-22 DIAGNOSIS — M51.26 DISPLACEMENT OF LUMBAR INTERVERTEBRAL DISC WITHOUT MYELOPATHY: ICD-10-CM

## 2021-04-22 PROCEDURE — 97140 MANUAL THERAPY 1/> REGIONS: CPT | Performed by: PHYSICAL THERAPIST

## 2021-04-22 PROCEDURE — 97112 NEUROMUSCULAR REEDUCATION: CPT | Performed by: PHYSICAL THERAPIST

## 2021-04-22 NOTE — PROGRESS NOTES
"   Physical Therapy Daily Progress Note       Discharge Summary    Patient: Cindy Robert   : 1970  Referring practitioner: SPENCER Pulido  Date of Initial Visit: Type: THERAPY  Noted: 3/9/2021  Today's Date: 2021  Patient seen for 10 sessions    Recert due:  21  MD followup:  prlaura     Cindy Robert reports: 90% improved   Subjective Evaluation    History of Present Illness    Subjective comment: pt states that she has no pain this afternoon but had just a little last night, not sure why. \"So pleased with outcome.\" states she needs to have limited time today b/c  needs vehicle.Pain  No pain reported           Objective          Strength/Myotome Testing     Right Ankle/Foot   Dorsiflexion: 5  Plantar flexion: 5  Inversion: 5  Eversion: 5      See Exercise, Manual, and Modality Logs for complete treatment.       Assessment & Plan     Assessment  Assessment details: Pt with incr strength all planes and no pain to MMT. Review of HEP going forward and cont. Verbalized and demonstrated good understanding of HEP and mgt. Pt met strength goal and progressing on LEFS.    Goals  Plan Goals: Plan Goals: STG's all met  1) Demonstrate independence/compliance in performance of initial HEP (met)  2) Demonstrate improved R ankle DF AROM to 10 deg or greater (met)  3) Demonstrate improved R ankle PF AROM to 55 deg or greater (met)  4) Subjectively report going 2 weeks without wearing boot with functional activities (met)    LTG's by 2021  1) Subjectively report 60% overall improvement or greater (met)  2) Improve LEFS score to 67/80 or greater (progressing/most activ no difficulty-pt didn't rate the activities that she doesn't do such as running or hopping)  3) Demonstrate improved R ankle MMT to 5/5 in all planes  (met)  4) Perform R SLS on airex for 30\" with good LE control, finger touch on rail as needed (met)  5) Tolerate 20 minutes or greater of balance/proprioception and strengthening " activities without increased pain (met)    Plan  Duration in visits: 6  Plan details: DC to I HEP/mgt per pt wishes and PT POC concurs        Visit Diagnoses:    ICD-10-CM ICD-9-CM   1. Plantar fascial fibromatosis  M72.2 728.71   2. Displacement of lumbar intervertebral disc without myelopathy  M51.26 722.10       Other DC           Timed:  Manual Therapy:    10     mins  62903;  Therapeutic Exercise:      5   mins  42099;     Neuromuscular Davis:    20    mins  48335;    Therapeutic Activity:          mins  55214;     Gait Training:           mins  84488;     Ultrasound:          mins  86128;    Electrical Stimulation:         mins  68569 ( );    Untimed:  Electrical Stimulation:         mins  53176 ( );  Mechanical Traction:         mins  58034;  Paraffin                               mins  27792;     Timed Treatment:   35   mins   Total Treatment:     35   mins  Camille Guerra PTA  Physical Therapist

## 2022-09-11 ENCOUNTER — APPOINTMENT (OUTPATIENT)
Dept: GENERAL RADIOLOGY | Age: 52
End: 2022-09-11
Payer: COMMERCIAL

## 2022-09-11 ENCOUNTER — HOSPITAL ENCOUNTER (EMERGENCY)
Age: 52
Discharge: HOME | End: 2022-09-11
Attending: EMERGENCY MEDICINE
Payer: COMMERCIAL

## 2022-09-11 VITALS
OXYGEN SATURATION: 92 % | RESPIRATION RATE: 18 BRPM | BODY MASS INDEX: 28.12 KG/M2 | HEART RATE: 77 BPM | HEIGHT: 66 IN | WEIGHT: 175 LBS | DIASTOLIC BLOOD PRESSURE: 81 MMHG | SYSTOLIC BLOOD PRESSURE: 115 MMHG | TEMPERATURE: 99.3 F

## 2022-09-11 DIAGNOSIS — R93.89 ABNORMAL X-RAY: ICD-10-CM

## 2022-09-11 DIAGNOSIS — B33.8 RESPIRATORY SYNCYTIAL VIRUS (RSV): Primary | ICD-10-CM

## 2022-09-11 LAB
ADENOVIRUS BY PCR: NOT DETECTED
ALBUMIN SERPL-MCNC: 4.6 G/DL (ref 3.5–5.2)
ALP BLD-CCNC: 109 U/L (ref 35–104)
ALT SERPL-CCNC: 13 U/L (ref 5–33)
ANION GAP SERPL CALCULATED.3IONS-SCNC: 11 MMOL/L (ref 7–19)
AST SERPL-CCNC: 17 U/L (ref 5–32)
BILIRUB SERPL-MCNC: 0.4 MG/DL (ref 0.2–1.2)
BILIRUBIN URINE: NEGATIVE
BLOOD, URINE: NEGATIVE
BORDETELLA PARAPERTUSSIS BY PCR: NOT DETECTED
BORDETELLA PERTUSSIS BY PCR: NOT DETECTED
BUN BLDV-MCNC: 12 MG/DL (ref 6–20)
CALCIUM SERPL-MCNC: 9.6 MG/DL (ref 8.6–10)
CHLAMYDOPHILIA PNEUMONIAE BY PCR: NOT DETECTED
CHLORIDE BLD-SCNC: 100 MMOL/L (ref 98–111)
CLARITY: CLEAR
CO2: 25 MMOL/L (ref 22–29)
COLOR: YELLOW
CORONAVIRUS 229E BY PCR: NOT DETECTED
CORONAVIRUS HKU1 BY PCR: NOT DETECTED
CORONAVIRUS NL63 BY PCR: NOT DETECTED
CORONAVIRUS OC43 BY PCR: NOT DETECTED
CREAT SERPL-MCNC: 0.9 MG/DL (ref 0.5–0.9)
GFR AFRICAN AMERICAN: >59
GFR NON-AFRICAN AMERICAN: >60
GLUCOSE BLD-MCNC: 108 MG/DL (ref 74–109)
GLUCOSE URINE: NEGATIVE MG/DL
HCT VFR BLD CALC: 45.8 % (ref 37–47)
HEMOGLOBIN: 15 G/DL (ref 12–16)
HUMAN METAPNEUMOVIRUS BY PCR: NOT DETECTED
HUMAN RHINOVIRUS/ENTEROVIRUS BY PCR: NOT DETECTED
INFLUENZA A BY PCR: NOT DETECTED
INFLUENZA B BY PCR: NOT DETECTED
KETONES, URINE: NEGATIVE MG/DL
LEUKOCYTE ESTERASE, URINE: NEGATIVE
MCH RBC QN AUTO: 28.7 PG (ref 27–31)
MCHC RBC AUTO-ENTMCNC: 32.8 G/DL (ref 33–37)
MCV RBC AUTO: 87.7 FL (ref 81–99)
MYCOPLASMA PNEUMONIAE BY PCR: NOT DETECTED
NITRITE, URINE: NEGATIVE
PARAINFLUENZA VIRUS 1 BY PCR: NOT DETECTED
PARAINFLUENZA VIRUS 2 BY PCR: NOT DETECTED
PARAINFLUENZA VIRUS 3 BY PCR: NOT DETECTED
PARAINFLUENZA VIRUS 4 BY PCR: NOT DETECTED
PDW BLD-RTO: 11.7 % (ref 11.5–14.5)
PH UA: 6 (ref 5–8)
PLATELET # BLD: 286 K/UL (ref 130–400)
PMV BLD AUTO: 8.9 FL (ref 9.4–12.3)
POTASSIUM REFLEX MAGNESIUM: 4.1 MMOL/L (ref 3.5–5)
PRO-BNP: 488 PG/ML (ref 0–900)
PROTEIN UA: NEGATIVE MG/DL
RBC # BLD: 5.22 M/UL (ref 4.2–5.4)
RESPIRATORY SYNCYTIAL VIRUS BY PCR: DETECTED
SARS-COV-2, PCR: NOT DETECTED
SODIUM BLD-SCNC: 136 MMOL/L (ref 136–145)
SPECIFIC GRAVITY UA: 1.01 (ref 1–1.03)
TOTAL PROTEIN: 7.6 G/DL (ref 6.6–8.7)
TROPONIN: <0.01 NG/ML (ref 0–0.03)
UROBILINOGEN, URINE: 1 E.U./DL
WBC # BLD: 8.1 K/UL (ref 4.8–10.8)

## 2022-09-11 PROCEDURE — 71046 X-RAY EXAM CHEST 2 VIEWS: CPT

## 2022-09-11 PROCEDURE — 0202U NFCT DS 22 TRGT SARS-COV-2: CPT

## 2022-09-11 PROCEDURE — 80053 COMPREHEN METABOLIC PANEL: CPT

## 2022-09-11 PROCEDURE — 84484 ASSAY OF TROPONIN QUANT: CPT

## 2022-09-11 PROCEDURE — 93005 ELECTROCARDIOGRAM TRACING: CPT | Performed by: EMERGENCY MEDICINE

## 2022-09-11 PROCEDURE — 81003 URINALYSIS AUTO W/O SCOPE: CPT

## 2022-09-11 PROCEDURE — 83880 ASSAY OF NATRIURETIC PEPTIDE: CPT

## 2022-09-11 PROCEDURE — 99285 EMERGENCY DEPT VISIT HI MDM: CPT

## 2022-09-11 PROCEDURE — 36415 COLL VENOUS BLD VENIPUNCTURE: CPT

## 2022-09-11 PROCEDURE — 71046 X-RAY EXAM CHEST 2 VIEWS: CPT | Performed by: RADIOLOGY

## 2022-09-11 PROCEDURE — 85027 COMPLETE CBC AUTOMATED: CPT

## 2022-09-11 RX ORDER — PREDNISONE 20 MG/1
20 TABLET ORAL DAILY
COMMUNITY

## 2022-09-11 RX ORDER — AMOXICILLIN AND CLAVULANATE POTASSIUM 250; 125 MG/1; MG/1
1 TABLET, FILM COATED ORAL 3 TIMES DAILY
COMMUNITY

## 2022-09-11 ASSESSMENT — PAIN - FUNCTIONAL ASSESSMENT: PAIN_FUNCTIONAL_ASSESSMENT: NONE - DENIES PAIN

## 2022-09-11 ASSESSMENT — ENCOUNTER SYMPTOMS
VOICE CHANGE: 1
DIARRHEA: 0
TROUBLE SWALLOWING: 0
STRIDOR: 0
EYE PAIN: 0
COUGH: 1
RHINORRHEA: 1
VOMITING: 0
SHORTNESS OF BREATH: 1
ABDOMINAL PAIN: 0

## 2022-09-11 NOTE — ED PROVIDER NOTES
[facility] EMERGENCY DEPT  eMERGENCY dEPARTMENT eNCOUnter      Pt Name: Carmelina Salmeron  MRN: [id number]  [place] 1970  Date of evaluation: 9/11/2022  Provider: Eulogio Pruitt MD    CHIEF COMPLAINT       Chief Complaint   Patient presents with    Shortness of Breath    Cough     Pt was seen Friday by PCP dx with Resp infection and given meds pt is taking meds but feels much worse and soa         HISTORY OF PRESENT ILLNESS   (Location/Symptom, Timing/Onset,Context/Setting, Quality, Duration, Modifying Factors, Severity)  Note limiting factors. Carmelina Salmeron is a 45 y.o. female who presents to the emergency department multiple complaints. Patient said about 3 days ago she noticed her voice was hoarse and then she developed cough and congestion. Was seen 2 days ago by her doctor in [address] [place] [address] and had negative flu and COVID swabs. Was put on Augmentin and steroids due to concern for developing pneumonia. Symptoms have worsened since. Tells me she is having discomfort in the center of her chest a little more on the right. That is constant for at least the past 24 hours but worse when she coughs or exerts herself. Cough has been productive of clear sputum initially but cough is now nonproductive and she is had a lot of rhinorrhea this been clear. No hemoptysis. No pleuritic chest pain. No unilateral leg swelling or pain. No history of DVT or PE. Patient's ears feel congested, especially on the left, and she has had a mild headache. No history of cardiac or pulmonary disease. No abdominal pain nausea vomiting diarrhea. No urinary complaints. HPI    NursingNotes were reviewed. REVIEW OF SYSTEMS    (2-9 systems for level 4, 10 or more for level 5)     Review of Systems   Constitutional:  Positive for chills, fatigue and fever. HENT:  Positive for congestion, ear pain, rhinorrhea and voice change (hoarse). Negative for trouble swallowing. Eyes:  Negative for pain and visual disturbance. Respiratory:  Positive for cough and shortness of breath. Negative for stridor. Cardiovascular:  Positive for chest pain. Negative for palpitations and leg swelling. Gastrointestinal:  Negative for abdominal pain, diarrhea and vomiting. Genitourinary:  Negative for difficulty urinating and dysuria. Musculoskeletal:  Negative for neck pain and neck stiffness. Skin:  Negative for rash. Neurological:  Positive for headaches (mild). Negative for speech difficulty, weakness and numbness. All other systems reviewed and are negative. A complete review of systems was performed and is negative except as noted above in the HPI. PAST MEDICAL HISTORY     Past Medical History:   Diagnosis Date    Hyperlipidemia          SURGICAL HISTORY       Past Surgical History:   Procedure Laterality Date    CHOLECYSTECTOMY, LAPAROSCOPIC N/A 2/3/2020    CHOLECYSTECTOMY LAPAROSCOPIC performed by Merlene Miller MD at [address] [place] [address]       Previous Medications    AMOXICILLIN-CLAVULANATE (AUGMENTIN) 250-125 MG PER TABLET    Take 1 tablet by mouth 3 times daily    CHOLECALCIFEROL (VITAMIN D3) 125 MCG (5000 UT) TABS    Take by mouth    CYCLOBENZAPRINE (FLEXERIL) 10 MG TABLET    10 mg one tab by mouth as needed    LEVOTHYROXINE (SYNTHROID) 100 MCG TABLET    Take 100 mcg by mouth Daily 1 tab daily except on Sunday    LIOTHYRONINE (CYTOMEL) 5 MCG TABLET    Take 1 tablet every day by oral route for 90 days. NAPROXEN (NAPROSYN) 500 MG TABLET    Take 500 mg by mouth three times a week     PREDNISONE (DELTASONE) 20 MG TABLET    Take 20 mg by mouth daily    ZINC ACETATE, ORAL, (ZINC ACETATE PO)    Take 1 tablet by mouth daily        ALLERGIES     Patient has no known allergies.     FAMILY HISTORY       Family History   Problem Relation Age of Onset    Heart Disease Mother     Other Father         Natural causes          SOCIAL HISTORY       Social History     Socioeconomic History    Marital status:      Spouse name: None    Number of children: None    Years of education: None    Highest education level: None   Tobacco Use    Smoking status: Never    Smokeless tobacco: Never   Vaping Use    Vaping Use: Never used   Substance and Sexual Activity    Alcohol use: Never    Drug use: Never       SCREENINGS    Mirtha Coma Scale  Eye Opening: Spontaneous  Best Verbal Response: Oriented  Best Motor Response: Obeys commands  Dowell Coma Scale Score: 15        PHYSICAL EXAM    (up to 7 for level 4, 8 or more for level 5)     ED Triage Vitals [09/11/22 1132]   BP Temp Temp Source Heart Rate Resp SpO2 Height Weight   123/78 99.6 °F (37.6 °C) Oral (!) 106 24 98 % 5' 6\" (1.676 m) 175 lb (79.4 kg)       Physical Exam  Vitals reviewed. Constitutional:       General: She is not in acute distress. Appearance: She is well-developed. HENT:      Head: Normocephalic and atraumatic. Jaw: There is normal jaw occlusion. Salivary Glands: Right salivary gland is not diffusely enlarged or tender. Left salivary gland is not diffusely enlarged or tender. Right Ear: Tympanic membrane, ear canal and external ear normal. No middle ear effusion. No mastoid tenderness. No hemotympanum. Tympanic membrane is not injected, scarred, perforated, erythematous, retracted or bulging. Left Ear: Hearing, ear canal and external ear normal. No decreased hearing noted. No drainage. A middle ear effusion (mild) is present. There is no impacted cerumen. No foreign body. No mastoid tenderness. No hemotympanum. Tympanic membrane is erythematous. Tympanic membrane is not perforated. Nose: Congestion and rhinorrhea present. Right Sinus: No maxillary sinus tenderness or frontal sinus tenderness. Left Sinus: No maxillary sinus tenderness or frontal sinus tenderness.       Mouth/Throat:      Mouth: Mucous membranes are moist.      Pharynx: Oropharynx is clear. No oropharyngeal exudate or posterior oropharyngeal erythema. Tonsils: No tonsillar exudate or tonsillar abscesses. Eyes:      General: No scleral icterus. Right eye: No discharge. Left eye: No discharge. Conjunctiva/sclera: Conjunctivae normal.      Pupils: Pupils are equal, round, and reactive to light. Neck:      Vascular: No JVD. Cardiovascular:      Rate and Rhythm: Normal rate and regular rhythm. Pulses: Normal pulses. Heart sounds: Normal heart sounds. Pulmonary:      Effort: Pulmonary effort is normal. No respiratory distress. Breath sounds: Normal breath sounds. No wheezing. Abdominal:      General: There is no distension. Palpations: Abdomen is soft. Tenderness: There is no abdominal tenderness. There is no right CVA tenderness, left CVA tenderness, guarding or rebound. Musculoskeletal:         General: No tenderness. Cervical back: Normal range of motion and neck supple. No rigidity. Right lower leg: No edema. Left lower leg: No edema. Lymphadenopathy:      Cervical: No cervical adenopathy. Skin:     General: Skin is warm and dry. Capillary Refill: Capillary refill takes less than 2 seconds. Neurological:      General: No focal deficit present. Mental Status: She is alert and oriented to person, place, and time. Psychiatric:         Mood and Affect: Mood normal.         Behavior: Behavior normal.       DIAGNOSTIC RESULTS     EKG: All EKG's are interpreted by the Emergency Department Physician who either signs or Co-signs this chart in the absence of a cardiologist.    Normal sinus rhythm. Normal QT.   Borderline T wave changes    RADIOLOGY:   Non-plain film images such as CT, Ultrasound and MRI are read by the radiologist. Plainradiographic images are visualized and preliminarily interpreted by the emergency physician with the below findings:        Interpretation per the Radiologist below, if available at the time of this note:    XR CHEST (2 VW)   Final Result   No acute abnormality. Recommendation:    Follow up as clinically indicated. Electronically Signed by Billy Villarreal MD at 11-Sep-2022 03:33:28 PM                     ED BEDSIDE ULTRASOUND:   Performed by ED Physician - none    LABS:  Labs Reviewed   RESPIRATORY PANEL, MOLECULAR, WITH COVID-19 - Abnormal; Notable for the following components:       Result Value    Respiratory Syncytial Virus by PCR DETECTED (*)     All other components within normal limits   CBC - Abnormal; Notable for the following components:    MCHC 32.8 (*)     MPV 8.9 (*)     All other components within normal limits   COMPREHENSIVE METABOLIC PANEL W/ REFLEX TO MG FOR LOW K - Abnormal; Notable for the following components:    Alkaline Phosphatase 109 (*)     All other components within normal limits   URINALYSIS WITH REFLEX TO CULTURE   TROPONIN   BRAIN NATRIURETIC PEPTIDE       All other labs were within normal range or not returned as of this dictation. EMERGENCY DEPARTMENT COURSE and DIFFERENTIALDIAGNOSIS/MDM:   Vitals:    Vitals:    09/11/22 1132 09/11/22 1430   BP: 123/78 107/74   Pulse: (!) 106 73   Resp: 24 19   Temp: 99.6 °F (37.6 °C)    TempSrc: Oral    SpO2: 98% 93%   Weight: 175 lb (79.4 kg)    Height: 5' 6\" (1.676 m)        MDM    Patient is nontoxic on exam and in no distress. Resting comfortably at this time. Patient positive for RSV. Suspect this is likely explanation for all of her symptoms. Discussed all of her results with her including incidental finding on chest x-ray (sclerotic focus in the left distal clavicle). Patient stable for discharge and see no strong indications for admission. Discussed possibly doing further work-up including head CT and CT of the chest rule out PE but I think these are unlikely. Also talked about doing further cardiac work-up.   Patient declined any further work-up and is comfortable going home and following up with her doctor for further evaluation. Told her that follow-up is important and to notify PCP about the abnormal finding on chest x-ray seen today and to return to the ER for change or worsening symptoms or new concerns. Patient agreeable plan. CONSULTS:  None    PROCEDURES:  Unless otherwise notedbelow, none     Procedures    FINAL IMPRESSION     1. Respiratory syncytial virus (RSV)    2.  Abnormal x-ray          DISPOSITION/PLAN   DISPOSITION Decision To Discharge 09/11/2022 03:40:01 PM      PATIENT REFERRED TO:  @FUP@    DISCHARGE MEDICATIONS:  New Prescriptions    No medications on file          (Please note that portions of this note were completed with a voice recognition program.  Efforts were made to edit the dictations butoccasionally words are mis-transcribed.)    Jamie Henderson MD (electronically signed)  AttendingEmergency Physician          Jamie Henderson MD  09/11/22 5767

## 2022-09-12 LAB
EKG P AXIS: 16 DEGREES
EKG P-R INTERVAL: 132 MS
EKG Q-T INTERVAL: 375 MS
EKG QRS DURATION: 77 MS
EKG QTC CALCULATION (BAZETT): 408 MS
EKG T AXIS: 33 DEGREES

## 2022-09-12 PROCEDURE — 93010 ELECTROCARDIOGRAM REPORT: CPT | Performed by: INTERNAL MEDICINE

## (undated) DEVICE — BAG SPEC REM 224ML W4XL6IN DIA10MM 1 HND GYN DISP ENDOPCH

## (undated) DEVICE — APPLIER CLP M/L SHFT DIA5MM 15 LIG LIGAMAX 5

## (undated) DEVICE — SUTURE VCRL SZ 3-0 L27IN ABSRB UD L26MM SH 1/2 CIR J416H

## (undated) DEVICE — LARYNGOSCOPE BLDE MAC HNDL M SZ 35 ST CURAPLEX CURAVIEW LED

## (undated) DEVICE — TROCAR: Brand: KII SHIELDED BLADED ACCESS SYSTEM

## (undated) DEVICE — SUTURE VCRL SZ 0 L27IN ABSRB VLT L36MM UR-5 5/8 CIR J376H

## (undated) DEVICE — GENERAL LAP CDS

## (undated) DEVICE — DECANTER VI VENT W/ VLV FOR ASEP TRNSF OF FLD

## (undated) DEVICE — SOLUTION IV IRRIG POUR BRL 0.9% SODIUM CHL 2F7124

## (undated) DEVICE — STERILE POLYISOPRENE POWDER-FREE SURGICAL GLOVES: Brand: PROTEXIS

## (undated) DEVICE — NEEDLE INSUF L120MM ULT VERES ENDOPATH

## (undated) DEVICE — ADHESIVE SKIN CLSR 0.7ML TOP DERMBND ADV